# Patient Record
Sex: FEMALE | Race: WHITE | NOT HISPANIC OR LATINO | Employment: UNEMPLOYED | URBAN - NONMETROPOLITAN AREA
[De-identification: names, ages, dates, MRNs, and addresses within clinical notes are randomized per-mention and may not be internally consistent; named-entity substitution may affect disease eponyms.]

---

## 2018-07-11 ENCOUNTER — HOSPITAL ENCOUNTER (EMERGENCY)
Facility: HOSPITAL | Age: 23
Discharge: STILL ADMITTED OR ADMINISTRATIVE | End: 2018-07-11
Attending: EMERGENCY MEDICINE

## 2018-07-11 ENCOUNTER — HOSPITAL ENCOUNTER (INPATIENT)
Facility: HOSPITAL | Age: 23
LOS: 4 days | Discharge: HOME | End: 2018-07-15
Attending: PSYCHIATRY & NEUROLOGY | Admitting: PSYCHIATRY & NEUROLOGY

## 2018-07-11 VITALS
OXYGEN SATURATION: 97 % | TEMPERATURE: 97.9 F | SYSTOLIC BLOOD PRESSURE: 122 MMHG | DIASTOLIC BLOOD PRESSURE: 52 MMHG | WEIGHT: 180 LBS | HEIGHT: 65 IN | HEART RATE: 67 BPM | BODY MASS INDEX: 29.99 KG/M2 | RESPIRATION RATE: 20 BRPM

## 2018-07-11 DIAGNOSIS — F13.20 BENZODIAZEPINE DEPENDENCE (HCC): Primary | ICD-10-CM

## 2018-07-11 DIAGNOSIS — Z34.92 SECOND TRIMESTER PREGNANCY: ICD-10-CM

## 2018-07-11 PROBLEM — F19.20 CHEMICAL DEPENDENCY (HCC): Status: ACTIVE | Noted: 2018-07-11

## 2018-07-11 LAB
6-ACETYL MORPHINE: NEGATIVE
ALBUMIN SERPL-MCNC: 3.8 G/DL (ref 3.5–5)
ALBUMIN/GLOB SERPL: 1.1 G/DL (ref 1.5–2.5)
ALP SERPL-CCNC: 91 U/L (ref 35–104)
ALT SERPL W P-5'-P-CCNC: 7 U/L (ref 10–36)
AMPHET+METHAMPHET UR QL: NEGATIVE
ANION GAP SERPL CALCULATED.3IONS-SCNC: 3.7 MMOL/L (ref 3.6–11.2)
AST SERPL-CCNC: 15 U/L (ref 10–30)
B-HCG UR QL: POSITIVE
BARBITURATES UR QL SCN: NEGATIVE
BASOPHILS # BLD AUTO: 0.03 10*3/MM3 (ref 0–0.3)
BASOPHILS NFR BLD AUTO: 0.3 % (ref 0–2)
BENZODIAZ UR QL SCN: NEGATIVE
BILIRUB SERPL-MCNC: 0.3 MG/DL (ref 0.2–1.8)
BILIRUB UR QL STRIP: NEGATIVE
BUN BLD-MCNC: 5 MG/DL (ref 7–21)
BUN/CREAT SERPL: 8.2 (ref 7–25)
BUPRENORPHINE SERPL-MCNC: POSITIVE NG/ML
CALCIUM SPEC-SCNC: 9.1 MG/DL (ref 7.7–10)
CANNABINOIDS SERPL QL: POSITIVE
CHLORIDE SERPL-SCNC: 110 MMOL/L (ref 99–112)
CLARITY UR: CLEAR
CO2 SERPL-SCNC: 20.3 MMOL/L (ref 24.3–31.9)
COCAINE UR QL: NEGATIVE
COLOR UR: YELLOW
CREAT BLD-MCNC: 0.61 MG/DL (ref 0.43–1.29)
DEPRECATED RDW RBC AUTO: 41.3 FL (ref 37–54)
EOSINOPHIL # BLD AUTO: 0.1 10*3/MM3 (ref 0–0.7)
EOSINOPHIL NFR BLD AUTO: 0.9 % (ref 0–5)
ERYTHROCYTE [DISTWIDTH] IN BLOOD BY AUTOMATED COUNT: 13 % (ref 11.5–14.5)
ETHANOL BLD-MCNC: <10 MG/DL
ETHANOL UR QL: <0.01 %
GFR SERPL CREATININE-BSD FRML MDRD: 121 ML/MIN/1.73
GLOBULIN UR ELPH-MCNC: 3.5 GM/DL
GLUCOSE BLD-MCNC: 78 MG/DL (ref 70–110)
GLUCOSE UR STRIP-MCNC: NEGATIVE MG/DL
HCT VFR BLD AUTO: 37 % (ref 37–47)
HGB BLD-MCNC: 12.5 G/DL (ref 12–16)
HGB UR QL STRIP.AUTO: NEGATIVE
IMM GRANULOCYTES # BLD: 0.13 10*3/MM3 (ref 0–0.03)
IMM GRANULOCYTES NFR BLD: 1.2 % (ref 0–0.5)
KETONES UR QL STRIP: NEGATIVE
LEUKOCYTE ESTERASE UR QL STRIP.AUTO: NEGATIVE
LYMPHOCYTES # BLD AUTO: 3.31 10*3/MM3 (ref 1–3)
LYMPHOCYTES NFR BLD AUTO: 29.8 % (ref 21–51)
MAGNESIUM SERPL-MCNC: 2.2 MG/DL (ref 1.7–2.6)
MCH RBC QN AUTO: 30.3 PG (ref 27–33)
MCHC RBC AUTO-ENTMCNC: 33.8 G/DL (ref 33–37)
MCV RBC AUTO: 89.6 FL (ref 80–94)
METHADONE UR QL SCN: NEGATIVE
MONOCYTES # BLD AUTO: 0.75 10*3/MM3 (ref 0.1–0.9)
MONOCYTES NFR BLD AUTO: 6.7 % (ref 0–10)
NEUTROPHILS # BLD AUTO: 6.8 10*3/MM3 (ref 1.4–6.5)
NEUTROPHILS NFR BLD AUTO: 61.1 % (ref 30–70)
NITRITE UR QL STRIP: NEGATIVE
OPIATES UR QL: NEGATIVE
OSMOLALITY SERPL CALC.SUM OF ELEC: 264.4 MOSM/KG (ref 273–305)
OXYCODONE UR QL SCN: NEGATIVE
PCP UR QL SCN: NEGATIVE
PH UR STRIP.AUTO: 6.5 [PH] (ref 5–8)
PLATELET # BLD AUTO: 153 10*3/MM3 (ref 130–400)
PMV BLD AUTO: 10.1 FL (ref 6–10)
POTASSIUM BLD-SCNC: 4.1 MMOL/L (ref 3.5–5.3)
PROT SERPL-MCNC: 7.3 G/DL (ref 6–8)
PROT UR QL STRIP: NEGATIVE
RBC # BLD AUTO: 4.13 10*6/MM3 (ref 4.2–5.4)
SODIUM BLD-SCNC: 134 MMOL/L (ref 135–153)
SP GR UR STRIP: 1.02 (ref 1–1.03)
UROBILINOGEN UR QL STRIP: NORMAL
WBC NRBC COR # BLD: 11.12 10*3/MM3 (ref 4.5–12.5)

## 2018-07-11 PROCEDURE — 81003 URINALYSIS AUTO W/O SCOPE: CPT | Performed by: PHYSICIAN ASSISTANT

## 2018-07-11 PROCEDURE — 80307 DRUG TEST PRSMV CHEM ANLYZR: CPT | Performed by: PHYSICIAN ASSISTANT

## 2018-07-11 PROCEDURE — 83735 ASSAY OF MAGNESIUM: CPT | Performed by: PHYSICIAN ASSISTANT

## 2018-07-11 PROCEDURE — 93010 ELECTROCARDIOGRAM REPORT: CPT | Performed by: INTERNAL MEDICINE

## 2018-07-11 PROCEDURE — 80053 COMPREHEN METABOLIC PANEL: CPT | Performed by: PHYSICIAN ASSISTANT

## 2018-07-11 PROCEDURE — 85025 COMPLETE CBC W/AUTO DIFF WBC: CPT | Performed by: PHYSICIAN ASSISTANT

## 2018-07-11 PROCEDURE — 93005 ELECTROCARDIOGRAM TRACING: CPT | Performed by: PSYCHIATRY & NEUROLOGY

## 2018-07-11 PROCEDURE — 81025 URINE PREGNANCY TEST: CPT | Performed by: PHYSICIAN ASSISTANT

## 2018-07-11 RX ORDER — PRENATAL VIT/IRON FUM/FOLIC AC 27MG-0.8MG
1 TABLET ORAL DAILY
Status: DISCONTINUED | OUTPATIENT
Start: 2018-07-11 | End: 2018-07-15 | Stop reason: HOSPADM

## 2018-07-11 RX ORDER — CLONAZEPAM 0.5 MG/1
1 TABLET ORAL 2 TIMES DAILY PRN
Status: CANCELLED | OUTPATIENT
Start: 2018-07-11

## 2018-07-11 RX ORDER — MULTIVITAMIN
1 TABLET ORAL DAILY
Status: DISCONTINUED | OUTPATIENT
Start: 2018-07-11 | End: 2018-07-11

## 2018-07-11 RX ORDER — BUPRENORPHINE HYDROCHLORIDE 8 MG/1
24 TABLET SUBLINGUAL DAILY
Status: CANCELLED | OUTPATIENT
Start: 2018-07-11

## 2018-07-11 RX ORDER — BUPRENORPHINE HYDROCHLORIDE 8 MG/1
24 TABLET SUBLINGUAL DAILY
COMMUNITY
End: 2018-07-15 | Stop reason: HOSPADM

## 2018-07-11 RX ORDER — BUPRENORPHINE 2 MG/1
8 TABLET SUBLINGUAL 2 TIMES DAILY
Status: DISCONTINUED | OUTPATIENT
Start: 2018-07-11 | End: 2018-07-15 | Stop reason: HOSPADM

## 2018-07-11 RX ORDER — CLONAZEPAM 1 MG/1
1 TABLET ORAL 2 TIMES DAILY PRN
COMMUNITY
End: 2018-07-15 | Stop reason: HOSPADM

## 2018-07-11 RX ADMIN — PRENATAL VIT W/ FE FUMARATE-FA TAB 27-0.8 MG 1 TABLET: 27-0.8 TAB at 17:40

## 2018-07-11 RX ADMIN — BUPRENORPHINE HCL 8 MG: 2 TABLET SUBLINGUAL at 17:40

## 2018-07-11 NOTE — NURSING NOTE
Patient pockets emptied. Thorough search complete. Undergarments searched by intake nurse and was witnessed () Per ED policy 100. The patient was placed in hospital attire. Belongings were logged on personal belongings sheet. Room was swept for any potential safety hazards room cleared and patient placed in treatment room

## 2018-07-11 NOTE — ED PROVIDER NOTES
Subjective   23-year-old female who presents to the ED today for a detox assessment.  She states she is currently at the [facility] and was brought here today for detox from Klonopin.  She states she uses 4-5 mg of Klonopin daily.  She last used this morning at 10:30 AM.  She states she is 24 weeks gestation.  She is a  Ab0.  She denies any current withdrawal symptoms.  She denies any suicidal ideations.  She states she uses 3 Subutex pills a day and she takes them as prescribed.  She's not had any today.  She denies any alcohol use.        History provided by:  Patient  Drug / Alcohol Assessment   Primary symptoms comment: requesting detox. This is a new problem. The current episode started 3 to 5 hours ago. The problem has not changed since onset.Suspected agents include prescription drugs. Pertinent negatives include no fever, no injury, no nausea, no vomiting, no bladder incontinence and no bowel incontinence. Associated medical issues include addiction treatment.       Review of Systems   Constitutional: Negative for fever.   HENT: Negative.    Eyes: Negative.    Respiratory: Negative.    Cardiovascular: Negative.    Gastrointestinal: Negative for bowel incontinence, nausea and vomiting.   Genitourinary: Negative.  Negative for bladder incontinence.   Musculoskeletal: Negative.    Skin: Negative.    Neurological: Negative.    Psychiatric/Behavioral: Negative.    All other systems reviewed and are negative.      History reviewed. No pertinent past medical history.    No Known Allergies    Past Surgical History:   Procedure Laterality Date   • EAR TUBES Bilateral 83       Family History   Problem Relation Age of Onset   • Bipolar disorder Mother    • ADD / ADHD Neg Hx    • Suicide Attempts Neg Hx    • Self-Injurious Behavior  Neg Hx    • Alcohol abuse Neg Hx        Social History     Social History   • Marital status: Single     Social History Main Topics   • Smoking status: Current Every Day  Smoker     Packs/day: 1.00     Years: 10.00     Start date: 4/2/2008   • Smokeless tobacco: Current User      Comment: pt is pregnant   • Alcohol use No   • Drug use: Yes     Types: Benzodiazepines, Other      Comment: suboxone   • Sexual activity: Yes     Other Topics Concern   • Not on file           Objective   Physical Exam   Constitutional: She is oriented to person, place, and time. She appears well-developed and well-nourished. No distress.   HENT:   Head: Normocephalic and atraumatic.   Right Ear: External ear normal.   Left Ear: External ear normal.   Nose: Nose normal.   Mouth/Throat: Oropharynx is clear and moist.   Eyes: Conjunctivae and EOM are normal. Pupils are equal, round, and reactive to light.   Neck: Normal range of motion. Neck supple.   Cardiovascular: Normal rate, regular rhythm, normal heart sounds and intact distal pulses.    Pulmonary/Chest: Effort normal and breath sounds normal.   Abdominal: Soft. Bowel sounds are normal. There is no tenderness.   Fundus palpable just above the umbilicus.  FHT's normal   Musculoskeletal: Normal range of motion.   Neurological: She is alert and oriented to person, place, and time.   Skin: Skin is warm and dry. Capillary refill takes less than 2 seconds.   Psychiatric: She has a normal mood and affect. Her behavior is normal. Judgment and thought content normal.   Nursing note and vitals reviewed.      Procedures           ED Course  ED Course as of Jul 11 1909   Wed Jul 11, 2018   1602 Medically clear for detox  [AH]      ED Course User Index  [AH] CANDE Clal                  MDM  Number of Diagnoses or Management Options  Benzodiazepine dependence (CMS/HCC):   Second trimester pregnancy:      Amount and/or Complexity of Data Reviewed  Clinical lab tests: reviewed    Patient Progress  Patient progress: stable        Final diagnoses:   Benzodiazepine dependence (CMS/HCC)   Second trimester pregnancy            CANDE Call  07/11/18 3469

## 2018-07-12 PROBLEM — F13.20 BENZODIAZEPINE DEPENDENCE (HCC): Status: ACTIVE | Noted: 2018-07-12

## 2018-07-12 PROBLEM — Z3A.24 24 WEEKS GESTATION OF PREGNANCY: Status: ACTIVE | Noted: 2018-07-12

## 2018-07-12 PROBLEM — F11.20 UNCOMPLICATED OPIOID DEPENDENCE (HCC): Status: ACTIVE | Noted: 2018-07-11

## 2018-07-12 LAB
ABO GROUP BLD: NORMAL
ABO GROUP BLD: NORMAL
ALBUMIN SERPL-MCNC: 3.7 G/DL (ref 3.5–5)
ALBUMIN/GLOB SERPL: 1.2 G/DL (ref 1.5–2.5)
ALP SERPL-CCNC: 84 U/L (ref 35–104)
ALT SERPL W P-5'-P-CCNC: 6 U/L (ref 10–36)
ANION GAP SERPL CALCULATED.3IONS-SCNC: 3.3 MMOL/L (ref 3.6–11.2)
AST SERPL-CCNC: 9 U/L (ref 10–30)
BASOPHILS # BLD AUTO: 0.02 10*3/MM3 (ref 0–0.3)
BASOPHILS NFR BLD AUTO: 0.2 % (ref 0–2)
BILIRUB SERPL-MCNC: 0.3 MG/DL (ref 0.2–1.8)
BLD GP AB SCN SERPL QL: NEGATIVE
BUN BLD-MCNC: 10 MG/DL (ref 7–21)
BUN/CREAT SERPL: 16.9 (ref 7–25)
CALCIUM SPEC-SCNC: 8.9 MG/DL (ref 7.7–10)
CHLORIDE SERPL-SCNC: 106 MMOL/L (ref 99–112)
CO2 SERPL-SCNC: 26.7 MMOL/L (ref 24.3–31.9)
CREAT BLD-MCNC: 0.59 MG/DL (ref 0.43–1.29)
DEPRECATED RDW RBC AUTO: 40.2 FL (ref 37–54)
EOSINOPHIL # BLD AUTO: 0.12 10*3/MM3 (ref 0–0.7)
EOSINOPHIL NFR BLD AUTO: 1.4 % (ref 0–5)
ERYTHROCYTE [DISTWIDTH] IN BLOOD BY AUTOMATED COUNT: 13 % (ref 11.5–14.5)
GFR SERPL CREATININE-BSD FRML MDRD: 125 ML/MIN/1.73
GLOBULIN UR ELPH-MCNC: 3.1 GM/DL
GLUCOSE BLD-MCNC: 76 MG/DL (ref 70–110)
HBV SURFACE AG SERPL QL IA: NORMAL
HCT VFR BLD AUTO: 33.9 % (ref 37–47)
HCV AB SER DONR QL: REACTIVE
HGB BLD-MCNC: 11.6 G/DL (ref 12–16)
IMM GRANULOCYTES # BLD: 0.1 10*3/MM3 (ref 0–0.03)
IMM GRANULOCYTES NFR BLD: 1.1 % (ref 0–0.5)
LYMPHOCYTES # BLD AUTO: 3.18 10*3/MM3 (ref 1–3)
LYMPHOCYTES NFR BLD AUTO: 36.5 % (ref 21–51)
MCH RBC QN AUTO: 30.4 PG (ref 27–33)
MCHC RBC AUTO-ENTMCNC: 34.2 G/DL (ref 33–37)
MCV RBC AUTO: 88.7 FL (ref 80–94)
MONOCYTES # BLD AUTO: 0.54 10*3/MM3 (ref 0.1–0.9)
MONOCYTES NFR BLD AUTO: 6.2 % (ref 0–10)
NEUTROPHILS # BLD AUTO: 4.76 10*3/MM3 (ref 1.4–6.5)
NEUTROPHILS NFR BLD AUTO: 54.6 % (ref 30–70)
NUMBER OF DOSES: NORMAL
OSMOLALITY SERPL CALC.SUM OF ELEC: 269.8 MOSM/KG (ref 273–305)
PLATELET # BLD AUTO: 333 10*3/MM3 (ref 130–400)
PMV BLD AUTO: 9.7 FL (ref 6–10)
POTASSIUM BLD-SCNC: 3.9 MMOL/L (ref 3.5–5.3)
PROT SERPL-MCNC: 6.8 G/DL (ref 6–8)
RBC # BLD AUTO: 3.82 10*6/MM3 (ref 4.2–5.4)
RH BLD: POSITIVE
RH BLD: POSITIVE
SODIUM BLD-SCNC: 136 MMOL/L (ref 135–153)
WBC NRBC COR # BLD: 8.72 10*3/MM3 (ref 4.5–12.5)

## 2018-07-12 PROCEDURE — 80053 COMPREHEN METABOLIC PANEL: CPT | Performed by: OBSTETRICS & GYNECOLOGY

## 2018-07-12 PROCEDURE — 87340 HEPATITIS B SURFACE AG IA: CPT | Performed by: OBSTETRICS & GYNECOLOGY

## 2018-07-12 PROCEDURE — 86850 RBC ANTIBODY SCREEN: CPT | Performed by: PSYCHIATRY & NEUROLOGY

## 2018-07-12 PROCEDURE — 85025 COMPLETE CBC W/AUTO DIFF WBC: CPT | Performed by: OBSTETRICS & GYNECOLOGY

## 2018-07-12 PROCEDURE — 86900 BLOOD TYPING SEROLOGIC ABO: CPT | Performed by: PSYCHIATRY & NEUROLOGY

## 2018-07-12 PROCEDURE — 99222 1ST HOSP IP/OBS MODERATE 55: CPT | Performed by: PSYCHIATRY & NEUROLOGY

## 2018-07-12 PROCEDURE — 86900 BLOOD TYPING SEROLOGIC ABO: CPT

## 2018-07-12 PROCEDURE — 87536 HIV-1 QUANT&REVRSE TRNSCRPJ: CPT | Performed by: OBSTETRICS & GYNECOLOGY

## 2018-07-12 PROCEDURE — 86592 SYPHILIS TEST NON-TREP QUAL: CPT | Performed by: OBSTETRICS & GYNECOLOGY

## 2018-07-12 PROCEDURE — 86901 BLOOD TYPING SEROLOGIC RH(D): CPT

## 2018-07-12 PROCEDURE — 86901 BLOOD TYPING SEROLOGIC RH(D): CPT | Performed by: PSYCHIATRY & NEUROLOGY

## 2018-07-12 PROCEDURE — 86803 HEPATITIS C AB TEST: CPT | Performed by: OBSTETRICS & GYNECOLOGY

## 2018-07-12 RX ADMIN — BUPRENORPHINE HCL 8 MG: 2 TABLET SUBLINGUAL at 08:44

## 2018-07-12 RX ADMIN — PRENATAL VIT W/ FE FUMARATE-FA TAB 27-0.8 MG 1 TABLET: 27-0.8 TAB at 08:43

## 2018-07-12 RX ADMIN — BUPRENORPHINE HCL 8 MG: 2 TABLET SUBLINGUAL at 21:02

## 2018-07-12 NOTE — PLAN OF CARE
Problem: Patient Care Overview  Goal: Plan of Care Review  Outcome: Ongoing (interventions implemented as appropriate)   07/12/18 0033   Coping/Psychosocial   Plan of Care Reviewed With patient   Coping/Psychosocial   Patient Agreement with Plan of Care agrees   Plan of Care Review   Progress no change   OTHER   Outcome Summary Patient calm and cooperative. Rates anxiety 5. Denies depression, blanco., cravings. C/O leg cramps. No problems noted. Will continue to monitor.        Problem: Overarching Goals (Adult)  Goal: Adheres to Safety Considerations for Self and Others  Outcome: Ongoing (interventions implemented as appropriate)    Goal: Optimized Coping Skills in Response to Life Stressors  Outcome: Ongoing (interventions implemented as appropriate)    Goal: Develops/Participates in Therapeutic [place] to Support Successful Transition  Outcome: Ongoing (interventions implemented as appropriate)

## 2018-07-12 NOTE — PLAN OF CARE
Problem: Patient Care Overview  Goal: Plan of Care Review  Outcome: Ongoing (interventions implemented as appropriate)   07/12/18 1136   Coping/Psychosocial   Plan of Care Reviewed With patient   Coping/Psychosocial   Patient Agreement with Plan of Care agrees   Plan of Care Review   Progress no change   OTHER   Outcome Summary Therapist met with Patient to complete initial assessment and aftercare planning; Patient agreeable.    Coping/Psychosocial   Consent Given to Review Plan with Jonathan Kc (boyfriend) at [phone], Hina Miles (aunt) [phone]     Goal: Individualization and Mutuality  Outcome: Ongoing (interventions implemented as appropriate)   07/12/18 1131 07/12/18 1136   Individualization   Patient Specific Goals (Include Timeframe) --  Identify healthy coping skills and address relapse prevention planning during treatment.    Patient Specific Interventions --  Therapist will offer 1-4 therapy sessions, daily group, family education, and aftercare services.    Mutuality/Individual Preferences   What Anxieties, Fears, Concerns, or Questions Do You Have About Your Care? --  none   What Information Would Help Us Give You More Personalized Care? --  none   How Would You and/or Your Support Person Like to Participate in Your Care? --  communciation    Personal Strengths/Vulnerabilities   Patient Personal Strengths self-reliant;self-awareness;resourceful;stable living environment;tolerant;resilient;realistic evaluation of current/future capabilities;relationship stability;motivated for treatment;motivated for recovery;insight into illness/situation;expressive of needs;expressive of emotions --    Patient Vulnerabilities ineffective coping, limited support, substance abuse, current boyfriend active user  --      Goal: Discharge Needs Assessment  Outcome: Outcome(s) achieved Date Met: 07/12/18 07/12/18 1136   Discharge Needs Assessment   Readmission Within the Last 30 Days no  previous admission in last 30 days   Concerns to be Addressed substance/tobacco abuse/use;mental health;medication;coping/stress;decision making;cognitive/perceptual;compliance issue   Patient/Family Anticipates Transition to inpatient rehabilitation facility   Patient/Family Anticipated Services at Transition mental health services;rehabilitation services   Transportation Concerns car, none   Transportation Anticipated agency   Offered/Gave Vendor List no   Patient's Choice of Community Agency(s) [facility]    Current Discharge Risk substance use/abuse;lack of support system/caregiver   Discharge Coordination/Progress Therapist met with Mary Kaytent to complete discharge needs assessment and aftercare planning. Patient agreeable and reports to attend [facility] at discharge.    Discharge Needs Assessment,    Outpatient/Agency/Support Group Needs residential services   Anticipated Discharge Disposition inpatient rehab facility     Goal: Interprofessional Rounds/Family Conf  Outcome: Ongoing (interventions implemented as appropriate)   07/12/18 1136   Interdisciplinary Rounds/Family Conf   Summary Treatment Team Evalautions and Staffing    Interdisciplinary Rounds/Family Conf   Participants social work;patient;nursing;psychiatrist;other (see comments)  ( Navigators, UR)     1120  DATA:    Therapist met individually with Patient this date for initial evaluation.  Introduced self as Therapist and the role of a positive therapeutic relationship; Patient agreeable.    Therapist encouraged Patient to speak openly and honestly about any issues or stressors during treatment stay. Therapist explained how open communication is significant to providing most effective care. Therapist completed psychosocial assessment, integrated summary, reviewed care plans, disposition planning and discussed hospitalization expectations and treatment goals this date. Patient reports future plans to follow-up with residential  "services at the [facility] and signed consent. Patient also agreeable for family involvement in treatment and signed consent for her aunt Hina and boyfriend Jonathan.      ASSESSMENT:   Ms. Chinmay Thomas is a 23 year old, , single, mother of three and expecting, high school educated, female from [place], [place]. Patient presents herself to detoxification in efforts to gain sobriety with hopes to regain custody of her children. Patient reports being referred by the [facility] and plans to return there at discharge. Patient reports a history of substance beginning at age 14 with her children's father. Patient reports using Subutex and Klonopin \"more than prescribed\". Patient reports compliance with a suboxone clinic in [place] called \"[facility]\" for the past 2.5 years. Patient reports paying $575 per office visit. Patient reports recent IV use for the past year. Patient states being in a current relationship with an active user at this time. Patient reports lack of support, unemployment, and previous legal charges due to substance abuse.     Patient observed to be alert, calm, and cooperative today. Patient had difficulty providing substance use history and seemed to be minimal about her current use offering justification and explaining that she complies with a clinic. Patient did not appear to be motivated regarding sobriety today due to her somewhat vague responses.      PLAN:   Patient will receive 24/7 nursing monitoring and daily psychiatrist evaluation by a multidisciplinary team.    Patient will continue stabilization at this time.     Patient is agreeable for residential services with the [facility] at discharge.     Public assistance with transportation will not be needed. [facility] will provide.     Therapist will contact Patient's family members at a later time to involve in treatment.       "

## 2018-07-12 NOTE — CONSULTS
Chief complaint I was consulted regarding Mrs. Thomas.     History of Present Illness: Patient is a 23 y.o. female who presents with IUP at an estimated 24 weeks gestation. Patient is currently in Detox Day 2. Patient is taking Subutex. Prenatal care in [place].        Past Medical History:   Diagnosis Date   • Substance abuse         Past Surgical History:   Procedure Laterality Date   • EAR TUBES Bilateral 01/01/84     Family History   Problem Relation Age of Onset   • Bipolar disorder Mother    • ADD / ADHD Neg Hx    • Suicide Attempts Neg Hx    • Self-Injurious Behavior  Neg Hx    • Alcohol abuse Neg Hx      Social History   Substance Use Topics   • Smoking status: Current Every Day Smoker     Packs/day: 1.00     Years: 10.00     Start date: 4/2/2008   • Smokeless tobacco: Current User      Comment: pt is pregnant   • Alcohol use No     Prescriptions Prior to Admission   Medication Sig Dispense Refill Last Dose   • buprenorphine (SUBUTEX) 8 MG sublingual tablet SL tablet Place 24 mg under the tongue Daily.   7/10/2018 at Unknown time   • clonazePAM (KlonoPIN) 1 MG tablet Take 1 mg by mouth 2 (Two) Times a Day As Needed for Anxiety.   Unknown at Unknown time     Allergies:  Patient has no known allergies.      Vital Signs  Temp:  [96.8 °F (36 °C)-97.9 °F (36.6 °C)] 97 °F (36.1 °C)  Heart Rate:  [59-78] 59  Resp:  [18-20] 18  BP: ()/(47-71) 103/63    Radiology  Imaging Results (last 24 hours)     ** No results found for the last 24 hours. **          Labs  Lab Results (last 24 hours)     ** No results found for the last 24 hours. **            Review of Systems    The following systems were reviewed and negative;  ENT, respiratory, cardiovascular, gastrointestinal, genitourinary, breast, endocrine and allergies / immunologic.      Physical Exam:      General Appearance:    Alert, cooperative, in no acute distress   Head:    Normocephalic, without obvious abnormality, atraumatic   Eyes:             Lids and lashes normal, conjunctivae and sclerae normal, no   icterus, no pallor, corneas clear, PERRLA   Ears:    Ears appear intact with no abnormalities noted   Throat:   No oral lesions, no thrush, oral mucosa moist   Neck:   No adenopathy, supple, trachea midline, no thyromegaly, no     carotid bruit, no JVD   Back:     No kyphosis present, no scoliosis present, no skin lesions,       erythema or scars, no tenderness to percussion or                   palpation,   range of motion normal   Lungs:     Clear to auscultation,respirations regular, even and                   unlabored    Heart:    Regular rhythm and normal rate, normal S1 and S2, no            murmur, no gallop, no rub, no click   Breast Exam:    Deferred   Abdomen:     Normal bowel sounds, no masses, no organomegaly, soft        non-tender, non-distended, no guarding, no rebound                 tenderness   Genitalia:    Deferred   Extremities:   Moves all extremities well, no edema, no cyanosis, no              redness   Pulses:   Pulses palpable and equal bilaterally   Skin:   No bleeding, bruising or rash   Lymph nodes:   No palpable adenopathy   Neurologic:   Cranial nerves 2 - 12 grossly intact, sensation intact, DTR        present and equal bilaterally         Assessment: Patient is a 23 y.o. female who presents with IUP at 24 weeks gestation. Drug Abuse. Currently in Detox. Subutex.     Plan of Care: Prenatal Labs today. 1 hr GTT & Ultrasound next visit.      Jere Parry III, MD  07/12/18  2:21 PM

## 2018-07-12 NOTE — PLAN OF CARE
Problem: Overarching Goals (Adult)  Goal: Optimized Coping Skills in Response to Life Stressors  Outcome: Ongoing (interventions implemented as appropriate)    Goal: Develops/Participates in Therapeutic [place] to Support Successful Transition  Outcome: Ongoing (interventions implemented as appropriate)

## 2018-07-12 NOTE — DISCHARGE INSTR - APPOINTMENTS
[facility]  [address] [place], [place] [address]adrianna [place] [id number] [phone]    Can return to [facility] upon discharge from our facility    F/U  Dr. Jere Parry MD  8/9/2018  1:00 PM  [facility]  [phone]  [email or web address]

## 2018-07-12 NOTE — PROGRESS NOTES
[id number]  Therapist attempted to contact Patient's boyfriend Jonathan Tavares at [phone] however was unsuccessful. Therapist also attempted to contact Patient's aunt Hina Miles at [phone] but was unreachable; efforts will continue.

## 2018-07-12 NOTE — H&P
INITIAL PSYCHIATRIC HISTORY & PHYSICAL    Patient Identification:  Name:   Chinmay Thomas  Age:   23 y.o.  Sex:   female  :   1995  MRN:   [id number]  Visit Number:   [id number]  Primary Care Physician:   No Known Provider    SUBJECTIVE    CC: substance abuse     HPI: Chinmay Thomas is a 23 y.o. female who was admitted on 2018 with complaints of substance abuse. Patient presented to [facility] from [facility] requesting detoxification. Patient has noted positive HCG urine QL. She reports she is 24 week pregnant.  Patient reports being prescribed suboxone for past 3 yeas 3-8 mg strip daily. Reports she's been using up to 4-5 mg daily during the past including via  IV route.  . She reports upon finding out she was pregnant 2 months ago she began using Subutex po only 3-8 mg pills daily and up to 4-5 pills daily, last use , 2 days ago.    She also reports being prescribed Klonopin 2 mg daily for past 4-5 years. Reports at times she will use up to 4-5 mg daily , running out of medication and going a few days without , last use 2018. Reports smoking marijuana occasionally.   UDS is positive for buprenorphine and THC. She denies etoh use or other illicit drug use.  She reports she began using marijuana at 14 and smoking daily at the time. Reports at 15 she began using Meth with her Mother, didn't like but began using pain pills and use quickly became problematic. Reports at that time she experimented with Suboxone and began [facility].   She reports  chonic back pain following epidural childbirth.  She reports previous attempts have been made unsuccessfully r/t withdrawal symptoms. . She identifies a couple of days as  longest period of sobriety. She identifies several negative consequences of use including  Financial, relationship (she has lost custody of 3 children ) Reports paternal Grandmother temporary custody. She reports desire to obtain  sobriety and regain custody of children.  She reports ongoing  charges for Trespassing in [place] from 2016.  Patient  Reports failed IV attempt (says she missed vein)  on right hand . She plans to return to [facility]. Reports she's seen Jared Horowitz in [place], last appointment a month ago and says she was scheduled to see him today.  RN reports patient has scheduled obgyn consult today with [facility] at this facility. She denies sucidal ideation. She denies hallucination. She reports poor sleep and fair appetite.  She reports current generalized body aches , leg cramps, headache. She was admitted to the [facility] for safety and further stabilization.     Noted labs reveal  HCG QL positive       PAST PSYCHIATRIC HX:  Denies previous psychiatric or detoxification treatment.  Reports struggling with anxiety for years with medication, Klonopin for past 4-5 years.  She denies history of sexual, physical or emotional abuse. Previous self mutilation  behavior or suicidal attempts.     SUBSTANCE USE HX:  UDS is positive for buprenorphine and THC.  See hpi for current use. Denies previous etoh use. Reports [facility] in [place]     SOCIAL HX:  Patient is Single, has children who are 7,2 and 1 years old, reports children are in temporary custody of paternal Grandmother. He was born in [place], raised in [place], parents are  and live in [place]. Patient has 1 full sister and one half sister, two step sisters. Reports supportive family .  She lives in house with significant other and children . 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  Denies  experience . She has current charges for trespassing in [place] from 2016. Reports God is her Higher Power.     Denies previous history of seizure or concussion.   Past Medical History:   Diagnosis Date   • Substance abuse        Past Surgical History:   Procedure  Laterality Date   • EAR TUBES Bilateral 01/01/84       Family History   Problem Relation Age of Onset   • Bipolar disorder Mother    • ADD / ADHD Neg Hx    • Suicide Attempts Neg Hx    • Self-Injurious Behavior  Neg Hx    • Alcohol abuse Neg Hx          Prescriptions Prior to Admission   Medication Sig Dispense Refill Last Dose   • buprenorphine (SUBUTEX) 8 MG sublingual tablet SL tablet Place 24 mg under the tongue Daily.   7/10/2018 at Unknown time   • clonazePAM (KlonoPIN) 1 MG tablet Take 1 mg by mouth 2 (Two) Times a Day As Needed for Anxiety.   Unknown at Unknown time       Reviewed available past medical and psychiatric records.    ALLERGIES:  Patient has no known allergies.    Temp:  [96.8 °F (36 °C)-97.9 °F (36.6 °C)] 97 °F (36.1 °C)  Heart Rate:  [59-78] 59  Resp:  [18-20] 18  BP: ()/(47-71) 103/63    REVIEW OF SYSTEMS:  Review of Systems   Constitutional: Negative.    HENT: Negative.    Eyes: Negative.    Respiratory: Negative.    Cardiovascular: Negative.    Gastrointestinal: Negative.    Endocrine: Negative.    Genitourinary: Negative.    Musculoskeletal: Negative.    Skin: Negative.    Allergic/Immunologic: Negative.    Neurological: Negative.    Hematological: Negative.    Psychiatric/Behavioral: Negative.       See HPI for psychiatric ROS  OBJECTIVE    PHYSICAL EXAM:  Physical Exam    MENTAL STATUS EXAM:   Hygiene: good    Psychomotor Behavior:  Appropriate  Affect:  Appropriate  Hopelessness: Denies  Speech:  Normal  Thought Progress:  Linear  Thought Content:  Mood congurent  Suicidal:  None  Homicidal:  None  Hallucinations:  None  Delusion:  None  Memory:  Intact  Orientation:  Person, Place, Time and Situation  Reliability:  fair  Insight:  Fair  Judgement:  Poor  Impulse Control:  Poor  Physical/Medical Issues see medical list       Imaging Results (last 24 hours)     ** No results found for the last 24 hours. **           ECG/EMG Results (most recent)     Procedure Component Value Units  Date/Time    ECG 12 Lead [[id number]] Collected:  07/11/18 1721     Updated:  07/12/18 0819    Narrative:       Test Reason : Potential adverse reaction to medications.  Blood Pressure : **/** mmHG  Vent. Rate : 083 BPM     Atrial Rate : 083 BPM     P-R Int : 144 ms          QRS Dur : 090 ms      QT Int : 380 ms       P-R-T Axes : 044 055 037 degrees     QTc Int : 446 ms    Normal sinus rhythm  Normal ECG  No previous ECGs available  Confirmed by Laura Balderas ([id number]) on 7/12/2018 8:18:55 AM    Referred By:  MANAV           Confirmed By:Laura Balderas           Lab Results   Component Value Date    GLUCOSE 78 07/11/2018    BUN 5 (L) 07/11/2018    CREATININE 0.61 07/11/2018    EGFRIFNONA 121 07/11/2018    BCR 8.2 07/11/2018    CO2 20.3 (L) 07/11/2018    CALCIUM 9.1 07/11/2018    ALBUMIN 3.80 07/11/2018    LABIL2 1.1 (L) 07/11/2018    AST 15 07/11/2018    ALT 7 (L) 07/11/2018       Lab Results   Component Value Date    WBC 11.12 07/11/2018    HGB 12.5 07/11/2018    HCT 37.0 07/11/2018    MCV 89.6 07/11/2018     07/11/2018       Pain Management Panel     Pain Management Panel Latest Ref Rng & Units 7/11/2018    AMPHETAMINES SCREEN, URINE Negative Negative    BARBITURATES SCREEN Negative Negative    BENZODIAZEPINE SCREEN, URINE Negative Negative    BUPRENORPHINE Negative Positive(A)    COCAINE SCREEN, URINE Negative Negative    METHADONE SCREEN, URINE Negative Negative          Brief Urine Lab Results  (Last result in the past 365 days)      Color   Clarity   Blood   Leuk Est   Nitrite   Protein   CREAT   Urine HCG        07/11/18 1530               Positive(A)     07/11/18 1530 Yellow Clear Negative Negative Negative Negative               Reviewed labs and studies done with this admission.       ASSESSMENT & PLAN:      Patient Active Problem List   Diagnosis Code   • Uncomplicated opioid dependence (CMS/HCC) F11.20   • Benzodiazepine dependence (CMS/HCC) F13.20   • 24 weeks gestation of pregnancy Z3A.24      The patient will be continued on Subutex and dose adjusted to 16 mg daily. Explained to her that the lower dose will help to lower the risk of LISA. She is not reporting any withdrawal symptoms from Klonopin. Will not start her on Ativan detox yet and continue to monitor. The patient is agreeable.    The patient has been admitted for safety and stabilization.  Patient will be monitored for suicidality daily and maintained on 30 minute rounds for safety.  The patient will have individual and group therapy with a master's level therapist. A master treatment plan will be developed and agreed upon by the patient and his/her treatment team.  The patient's estimated length of stay in the hospital is 5-7 days.       This note was generated using a scribe, Sadia Osei RN   The work documented in this note was completed, reviewed, and approved by the attending psychiatrist as designated Dr. MARISSA block.

## 2018-07-12 NOTE — PLAN OF CARE
Problem: Patient Care Overview  Goal: Plan of Care Review  Outcome: Ongoing (interventions implemented as appropriate)   07/12/18 5262   Coping/Psychosocial   Plan of Care Reviewed With patient   Plan of Care Review   Progress improving

## 2018-07-13 PROCEDURE — 99232 SBSQ HOSP IP/OBS MODERATE 35: CPT | Performed by: PSYCHIATRY & NEUROLOGY

## 2018-07-13 RX ADMIN — BUPRENORPHINE HCL 8 MG: 2 TABLET SUBLINGUAL at 08:22

## 2018-07-13 RX ADMIN — BUPRENORPHINE HCL 8 MG: 2 TABLET SUBLINGUAL at 20:59

## 2018-07-13 RX ADMIN — PRENATAL VIT W/ FE FUMARATE-FA TAB 27-0.8 MG 1 TABLET: 27-0.8 TAB at 08:22

## 2018-07-13 NOTE — PLAN OF CARE
Problem: Patient Care Overview  Goal: Plan of Care Review  Outcome: Ongoing (interventions implemented as appropriate)   07/13/18 1621   Coping/Psychosocial   Plan of Care Reviewed With patient   Coping/Psychosocial   Patient Agreement with Plan of Care agrees   Plan of Care Review   Progress improving       Problem: Overarching Goals (Adult)  Goal: Adheres to Safety Considerations for Self and Others  Outcome: Ongoing (interventions implemented as appropriate)   07/13/18 1621   Overarching Goals (Adult)   Adheres to Safety Considerations for Self and Others making progress toward outcome     Goal: Optimized Coping Skills in Response to Life Stressors  Outcome: Ongoing (interventions implemented as appropriate)   07/13/18 1621   Overarching Goals (Adult)   Optimized Coping Skills in Response to Life Stressors making progress toward outcome     Goal: Develops/Participates in Therapeutic [place] to Support Successful Transition  Outcome: Ongoing (interventions implemented as appropriate)   07/13/18 1621   Overarching Goals (Adult)   Develops/Participates in Therapeutic [place] to Support Successful Transition making progress toward outcome

## 2018-07-13 NOTE — PROGRESS NOTES
"INPATIENT PSYCHIATRIC PROGRESS NOTE    Name:  Chinmay Thomas  :  1995  MRN:  [id number]  Visit Number:  [id number]  Length of stay:  2    SUBJECTIVE  CC: opioid withdrawals    INTERVAL HISTORY:  The patient states that she is experiencing withdrawals including leg cramps and diarrhea. Her vital signs are stable and has a good appetite. She was able to sleep about 7 hours last night and also took day time nap. She may be experiencing subjective discomfort as her dose was adjusted to Subutex 16 mg from 24 mg.       Review of Systems   Constitutional: Negative.    HENT: Negative.    Eyes: Negative.    Respiratory: Negative.    Cardiovascular: Negative.    Gastrointestinal: Positive for diarrhea.   Genitourinary: Negative.    Musculoskeletal:        Leg cramps       OBJECTIVE    Temp:  [97 °F (36.1 °C)-97.8 °F (36.6 °C)] 97 °F (36.1 °C)  Heart Rate:  [65-84] 83  Resp:  [16-18] 18  BP: (105-119)/(54-70) 118/70    MENTAL STATUS EXAM:  Appearance:Casually dressed, good hygeine.   Cooperation:Cooperative  Psychomotor: No psychomotor agitation/retardation, No EPS, No motor tics  Speech-normal rate, amount.  Mood \"ok\"   Affect-congruent, appropriate, stable  Thought Content-goal directed, no delusional material present  Thought process-linear, organized.  Suicidality: No SI  Homicidality: No HI  Perception: No AH/VH  Insight-fair   Judgement-fair    Lab Results (last 24 hours)     Procedure Component Value Units Date/Time    Hepatitis C Antibody [[id number]]  (Abnormal) Collected:  18 160    Specimen:  Blood Updated:  18 175     Hepatitis C Ab Reactive (A)    Hepatitis B Surface Antigen [[id number]]  (Normal) Collected:  18 160    Specimen:  Blood Updated:  18 1721     Hepatitis B Surface Ag Non-Reactive    Osmolality, Calculated [[id number]]  (Abnormal) Collected:  18 160    Specimen:  Blood Updated:  18 170     Osmolality Calc 269.8 (L) mOsm/kg     Comprehensive " Metabolic Panel [[id number]]  (Abnormal) Collected:  07/12/18 1602    Specimen:  Blood Updated:  07/12/18 1709     Glucose 76 mg/dL      BUN 10 mg/dL      Creatinine 0.59 mg/dL      Sodium 136 mmol/L      Potassium 3.9 mmol/L      Chloride 106 mmol/L      CO2 26.7 mmol/L      Calcium 8.9 mg/dL      Total Protein 6.8 g/dL      Albumin 3.70 g/dL      ALT (SGPT) 6 (L) U/L      AST (SGOT) 9 (L) U/L      Alkaline Phosphatase 84 U/L      Comment: Note New Reference Ranges        Total Bilirubin 0.3 mg/dL      eGFR Non African Amer 125 mL/min/1.73      Globulin 3.1 gm/dL      A/G Ratio 1.2 (L) g/dL      BUN/Creatinine Ratio 16.9     Anion Gap 3.3 (L) mmol/L     CBC & Differential [[id number]] Collected:  07/12/18 1602    Specimen:  Blood Updated:  07/12/18 1641    Narrative:       The following orders were created for panel order CBC & Differential.  Procedure                               Abnormality         Status                     ---------                               -----------         ------                     CBC Auto Differential[[id number]]        Abnormal            Final result                 Please view results for these tests on the individual orders.    CBC Auto Differential [[id number]]  (Abnormal) Collected:  07/12/18 1602    Specimen:  Blood Updated:  07/12/18 1641     WBC 8.72 10*3/mm3      RBC 3.82 (L) 10*6/mm3      Hemoglobin 11.6 (L) g/dL      Hematocrit 33.9 (L) %      MCV 88.7 fL      MCH 30.4 pg      MCHC 34.2 g/dL      RDW 13.0 %      RDW-SD 40.2 fl      MPV 9.7 fL      Platelets 333 10*3/mm3      Neutrophil % 54.6 %      Lymphocyte % 36.5 %      Monocyte % 6.2 %      Eosinophil % 1.4 %      Basophil % 0.2 %      Immature Grans % 1.1 (H) %      Neutrophils, Absolute 4.76 10*3/mm3      Lymphocytes, Absolute 3.18 (H) 10*3/mm3      Monocytes, Absolute 0.54 10*3/mm3      Eosinophils, Absolute 0.12 10*3/mm3      Basophils, Absolute 0.02 10*3/mm3      Immature Grans, Absolute 0.10 (H) 10*3/mm3      HIV-1 RNA, Quantitative, PCR (graph) [[id number]] Collected:  07/12/18 1602    Specimen:  Blood Updated:  07/12/18 1626    RPR [[id number]] Collected:  07/12/18 1602    Specimen:  Blood Updated:  07/12/18 1626             Imaging Results (last 24 hours)     ** No results found for the last 24 hours. **             ECG/EMG Results (most recent)     Procedure Component Value Units Date/Time    ECG 12 Lead [[id number]] Collected:  07/11/18 1721     Updated:  07/12/18 0819    Narrative:       Test Reason : Potential adverse reaction to medications.  Blood Pressure : **/** mmHG  Vent. Rate : 083 BPM     Atrial Rate : 083 BPM     P-R Int : 144 ms          QRS Dur : 090 ms      QT Int : 380 ms       P-R-T Axes : 044 055 037 degrees     QTc Int : 446 ms    Normal sinus rhythm  Normal ECG  No previous ECGs available  Confirmed by Laura Balderas ([id number]) on 7/12/2018 8:18:55 AM    Referred By:  MANAV           Confirmed By:Laura Balderas           ALLERGIES: Patient has no known allergies.      Current Facility-Administered Medications:   •  buprenorphine (SUBUTEX) SL tablet 8 mg, 8 mg, Sublingual, BID, Sharyn Garcia MD, 8 mg at 07/13/18 0822  •  prenatal vitamin 27-0.8 tablet 1 tablet, 1 tablet, Oral, Daily, Sharyn Garcia MD, 1 tablet at 07/13/18 0822    ASSESSMENT & PLAN:    Active Problems:    Uncomplicated opioid dependence (CMS/HCC)  Assessment: The patient appears stable, though is reporting subjective discomfort  Plan: The patient will be maintained on Subutex 16 mg during her pregnancy and then in about 12 weeks when she delivers the baby, the dose of Subutex will be tapered down.      Benzodiazepine dependence (CMS/HCC)  Assessment: No acute withdrawals noted. Pt reports no hx of withdrawals from benzos.  Plan: Continue to monitor and treat as indicated.      24 weeks gestation of pregnancy  Assessment: Stable  Plan: Prenatal vitamins. Appreciate help by the OB department.    The patient may be discharged to the  [facility] tomorrow if she is stable.      Suicide precautions: None    Behavioral Health Treatment Plan and Problem List: I have reviewed and approved the Behavioral Health Treatment Plan and Problem list.  The patient has had a chance to review and agrees with the treatment plan.     Clinician:  Sharyn Garcia MD  07/13/18  4:13 PM

## 2018-07-13 NOTE — PLAN OF CARE
Problem: Patient Care Overview  Goal: Plan of Care Review  Outcome: Ongoing (interventions implemented as appropriate)   07/13/18 0207   Coping/Psychosocial   Plan of Care Reviewed With patient   Coping/Psychosocial   Patient Agreement with Plan of Care agrees   Plan of Care Review   Progress improving   OTHER   Outcome Summary Pt cooperative but irritable for pm shift on 7/12/18. Reports anxiety 8/10, denies depression and cravings 7/10. Participated in group. 7/13/18 0208       Problem: Overarching Goals (Adult)  Goal: Adheres to Safety Considerations for Self and Others  Outcome: Ongoing (interventions implemented as appropriate)    Goal: Optimized Coping Skills in Response to Life Stressors  Outcome: Ongoing (interventions implemented as appropriate)    Goal: Develops/Participates in Therapeutic [place] to Support Successful Transition  Outcome: Ongoing (interventions implemented as appropriate)

## 2018-07-13 NOTE — PROGRESS NOTES
Navigator is assisting primary therapist with discharge planning. Contacted [facility] who confirms that the patient can return there upon her discharge from this facility. They will also provide transportation for the patient to [facility].

## 2018-07-14 LAB — RPR SER QL: NON REACTIVE

## 2018-07-14 PROCEDURE — 99232 SBSQ HOSP IP/OBS MODERATE 35: CPT | Performed by: PSYCHIATRY & NEUROLOGY

## 2018-07-14 RX ADMIN — BUPRENORPHINE HCL 8 MG: 2 TABLET SUBLINGUAL at 08:16

## 2018-07-14 RX ADMIN — BUPRENORPHINE HCL 8 MG: 2 TABLET SUBLINGUAL at 21:31

## 2018-07-14 RX ADMIN — PRENATAL VIT W/ FE FUMARATE-FA TAB 27-0.8 MG 1 TABLET: 27-0.8 TAB at 08:16

## 2018-07-14 NOTE — PROGRESS NOTES
"INPATIENT PSYCHIATRIC PROGRESS NOTE    Name:  Chinmay Thomas  :  1995  MRN:  [id number]  Visit Number:  [id number]  Length of stay:  3    SUBJECTIVE  CC: opioid withdrawals    INTERVAL HISTORY:  The patient states around this time she normally starts to experience more w/d symptoms. States she is feeling irritable and having back soreness. Requesting to be on 24 mg of Subutex.       Review of Systems   Constitutional: Negative.    HENT: Negative.    Eyes: Negative.    Respiratory: Negative.    Cardiovascular: Negative.    Gastrointestinal: Positive for diarrhea.   Genitourinary: Negative.    Musculoskeletal: Positive for arthralgias.        Leg cramps       OBJECTIVE    Temp:  [96.9 °F (36.1 °C)-98.2 °F (36.8 °C)] 96.9 °F (36.1 °C)  Heart Rate:  [] 86  Resp:  [16-20] 20  BP: ()/(52-90) 100/55    MENTAL STATUS EXAM:  Appearance:Casually dressed, good hygeine.   Cooperation:Cooperative  Psychomotor: No psychomotor agitation/retardation, No EPS, No motor tics  Speech-normal rate, amount.  Mood \"irritable\"   Affect-congruent, appropriate, stable  Thought Content-goal directed, no delusional material present  Thought process-linear, organized.  Suicidality: No SI  Homicidality: No HI  Perception: No AH/VH  Insight-fair   Judgement-fair    Lab Results (last 24 hours)     Procedure Component Value Units Date/Time    RPR [[id number]] Collected:  18 1602    Specimen:  Blood Updated:  18 0714     RPR Non Reactive    Narrative:       Performed at:  [address] [place] [address]  [id number]  : Alejandro Oviedo PhD, Phone:  [phone]             Imaging Results (last 24 hours)     ** No results found for the last 24 hours. **             ECG/EMG Results (most recent)     Procedure Component Value Units Date/Time    ECG 12 Lead [[id number]] Collected:  18 1721     Updated:  18 0819    Narrative:       Test Reason : Potential adverse reaction to " medications.  Blood Pressure : **/** mmHG  Vent. Rate : 083 BPM     Atrial Rate : 083 BPM     P-R Int : 144 ms          QRS Dur : 090 ms      QT Int : 380 ms       P-R-T Axes : 044 055 037 degrees     QTc Int : 446 ms    Normal sinus rhythm  Normal ECG  No previous ECGs available  Confirmed by Laura Balderas ([id number]) on 7/12/2018 8:18:55 AM    Referred By:  MANAV           Confirmed By:Laura Balderas           ALLERGIES: Patient has no known allergies.      Current Facility-Administered Medications:   •  buprenorphine (SUBUTEX) SL tablet 8 mg, 8 mg, Sublingual, BID, Sharyn Garcia MD, 8 mg at 07/14/18 0816  •  prenatal vitamin 27-0.8 tablet 1 tablet, 1 tablet, Oral, Daily, Sharyn Garcia MD, 1 tablet at 07/14/18 0816    ASSESSMENT & PLAN:    Active Problems:    Uncomplicated opioid dependence (CMS/HCC)  Assessment: The patient appears stable, though is reporting subjective discomfort  Plan: The patient will be maintained on Subutex 16 mg during her pregnancy and then in about 12 weeks when she delivers the baby, the dose of Subutex will be tapered down.      Benzodiazepine dependence (CMS/HCC)  Assessment: No acute withdrawals noted. Pt reports no hx of withdrawals from benzos.  Plan: Continue to monitor and treat as indicated.      24 weeks gestation of pregnancy  Assessment: Stable  Plan: Prenatal vitamins. Appreciate help by the OB department.    The patient may be discharged to the [facility] if stable- still appears to be in discomfort. Will continue to monitor and possible d/c tomorrow.       Suicide precautions: None    Behavioral Health Treatment Plan and Problem List: I have reviewed and approved the Behavioral Health Treatment Plan and Problem list.  The patient has had a chance to review and agrees with the treatment plan.     Clinician:  Jamal Hanna MD  07/14/18  3:32 PM

## 2018-07-14 NOTE — PLAN OF CARE
Problem: Patient Care Overview  Goal: Plan of Care Review  Outcome: Ongoing (interventions implemented as appropriate)   07/14/18 0651   Coping/Psychosocial   Plan of Care Reviewed With patient   Coping/Psychosocial   Patient Agreement with Plan of Care agrees   Plan of Care Review   Progress improving       Problem: Overarching Goals (Adult)  Goal: Adheres to Safety Considerations for Self and Others  Outcome: Ongoing (interventions implemented as appropriate)    Goal: Optimized Coping Skills in Response to Life Stressors  Outcome: Ongoing (interventions implemented as appropriate)    Goal: Develops/Participates in Therapeutic [place] to Support Successful Transition  Outcome: Ongoing (interventions implemented as appropriate)

## 2018-07-15 VITALS
WEIGHT: 180 LBS | TEMPERATURE: 97.2 F | HEART RATE: 89 BPM | OXYGEN SATURATION: 99 % | RESPIRATION RATE: 18 BRPM | HEIGHT: 65 IN | BODY MASS INDEX: 29.99 KG/M2 | DIASTOLIC BLOOD PRESSURE: 64 MMHG | SYSTOLIC BLOOD PRESSURE: 122 MMHG

## 2018-07-15 PROCEDURE — 99238 HOSP IP/OBS DSCHRG MGMT 30/<: CPT | Performed by: PSYCHIATRY & NEUROLOGY

## 2018-07-15 RX ORDER — PRENATAL VIT/IRON FUM/FOLIC AC 27MG-0.8MG
1 TABLET ORAL DAILY
Qty: 30 TABLET | Refills: 0 | Status: SHIPPED | OUTPATIENT
Start: 2018-07-15

## 2018-07-15 RX ORDER — BUPRENORPHINE HYDROCHLORIDE 8 MG/1
8 TABLET SUBLINGUAL 2 TIMES DAILY
Qty: 13 TABLET | Refills: 0 | Status: SHIPPED | OUTPATIENT
Start: 2018-07-15 | End: 2018-07-20 | Stop reason: SDUPTHER

## 2018-07-15 RX ORDER — POLYETHYLENE GLYCOL 3350 17 G/17G
17 POWDER, FOR SOLUTION ORAL DAILY PRN
Qty: 30 PACKET | Refills: 0 | Status: ON HOLD | OUTPATIENT
Start: 2018-07-15 | End: 2018-10-08 | Stop reason: HOSPADM

## 2018-07-15 RX ADMIN — BUPRENORPHINE HCL 8 MG: 2 TABLET SUBLINGUAL at 08:36

## 2018-07-15 RX ADMIN — PRENATAL VIT W/ FE FUMARATE-FA TAB 27-0.8 MG 1 TABLET: 27-0.8 TAB at 08:36

## 2018-07-15 NOTE — PLAN OF CARE
Problem: Patient Care Overview  Goal: Plan of Care Review  Outcome: Ongoing (interventions implemented as appropriate)   07/15/18 0603   Coping/Psychosocial   Plan of Care Reviewed With patient   Coping/Psychosocial   Patient Agreement with Plan of Care agrees   Plan of Care Review   Progress improving   OTHER   Outcome Summary Pt irritable for assessment on pm shift for 7/14/18. Reports Leg cramps and body aches. Anxiety 8/10, denies depression, cravings 7/10. Pt reports baby is active and reports last fetal movement for around 1700. Pt participated in group. Sleep and appetite are adequate. 7/15/18 0609       Problem: Overarching Goals (Adult)  Goal: Adheres to Safety Considerations for Self and Others  Outcome: Ongoing (interventions implemented as appropriate)    Goal: Optimized Coping Skills in Response to Life Stressors  Outcome: Ongoing (interventions implemented as appropriate)    Goal: Develops/Participates in Therapeutic [place] to Support Successful Transition  Outcome: Ongoing (interventions implemented as appropriate)

## 2018-07-15 NOTE — DISCHARGE SUMMARY
"  Date of Discharge:  7/15/2018    Discharge Diagnosis:Active Problems:    Uncomplicated opioid dependence (CMS/[facility])    Benzodiazepine dependence (CMS/[facility])    24 weeks gestation of pregnancy        Presenting Problem/History of Present Illness  Chemical dependency (CMS/[facility]) [F19.20]     Hospital Course  Patient is a 23 y.o. female presented with Opiate dependency.  She is also presents with 24 week gestation. She was was transferred from [facility] for detox. Her Subutex was continued and later adjusted to 16 mg daily. She was offered PRN medications and supportive care. She participated in groups and offered supportive psychotherapy. Pregnancy remained stable on medical concerns during hospitalization. She was not started on any psychotropics. She did not voice any concerns for SI/HI. Plan for to be discharged back to [facility] for rehab.     DISCHARGE DAY MENTAL STATUS EXAM:   Appearance:Casually dressed, good hygeine.   Cooperation:Cooperative  Psychomotor: No psychomotor agitation/retardation, No EPS, No motor tics  Speech-normal rate, amount.  Mood \"Better\"   Affect-congruent, appropriate, stable  Thought Content-goal directed, no delusional material present  Thought process-linear, organized.  Suicidality: No SI  Homicidality: No HI  Perception: No AH/VH  Insight-fair   Judgement-fair    Consults:   Consults     Date and Time Order Name Status Description    7/11/2018 1829 Inpatient Obstetrics / Gynecology Consult Completed           Pertinent Test Results: labs: UDS positve for Buprenorphine and THC. BHCG positive, all other labs wnl    Condition on Discharge:  improved    Vital Signs  Temp:  [96.9 °F (36.1 °C)-98.5 °F (36.9 °C)] 97.2 °F (36.2 °C)  Heart Rate:  [61-86] 64  Resp:  [18-20] 18  BP: (100-125)/(51-75) 117/65      Discharge Disposition  Home or Self Care    Discharge Medications     Discharge Medications      New Medications      Instructions Start Date   polyethylene glycol pack " packet  Commonly known as:  MIRALAX   17 g, Oral, Daily PRN      prenatal vitamin 27-0.8 27-0.8 MG tablet tablet   1 tablet, Oral, Daily         Changes to Medications      Instructions Start Date   buprenorphine 8 MG sublingual tablet SL tablet  Commonly known as:  SUBUTEX  What changed:  · how much to take  · when to take this   8 mg, Sublingual, 2 Times Daily         Continue These Medications      Instructions Start Date   clonazePAM 1 MG tablet  Commonly known as:  KlonoPIN   1 mg, Oral, 2 Times Daily PRN             Discharge Diet:   Diet Instructions     As tolerated.                   Activity at Discharge:   Activity Instructions     As tolerated.                 Follow-up Appointments  No future appointments.      Test Results Pending at Discharge   Order Current Status    HIV-1 RNA, Quantitative, PCR (graph) In process           Jamal Hanna MD  07/15/18  11:33 AM

## 2018-07-20 RX ORDER — BUPRENORPHINE HYDROCHLORIDE 8 MG/1
8 TABLET SUBLINGUAL 2 TIMES DAILY
Qty: 8 TABLET | Refills: 0 | Status: SHIPPED | OUTPATIENT
Start: 2018-07-20 | End: 2018-07-23 | Stop reason: SDUPTHER

## 2018-07-20 NOTE — TELEPHONE ENCOUNTER
Chery at [facility] Called requesting a Refill for Patient , Patient has a follow dave with Patsy on 7/23/18

## 2018-07-23 ENCOUNTER — OFFICE VISIT (OUTPATIENT)
Dept: PSYCHIATRY | Facility: CLINIC | Age: 23
End: 2018-07-23

## 2018-07-23 VITALS
WEIGHT: 191 LBS | DIASTOLIC BLOOD PRESSURE: 67 MMHG | BODY MASS INDEX: 31.82 KG/M2 | HEART RATE: 85 BPM | SYSTOLIC BLOOD PRESSURE: 116 MMHG | HEIGHT: 65 IN

## 2018-07-23 DIAGNOSIS — F11.20 UNCOMPLICATED OPIOID DEPENDENCE (HCC): Primary | ICD-10-CM

## 2018-07-23 DIAGNOSIS — Z79.899 LONG TERM USE OF DRUG: ICD-10-CM

## 2018-07-23 DIAGNOSIS — F13.21 BENZODIAZEPINE DEPENDENCE IN REMISSION (HCC): ICD-10-CM

## 2018-07-23 DIAGNOSIS — Z3A.26 26 WEEKS GESTATION OF PREGNANCY: ICD-10-CM

## 2018-07-23 LAB
AMPHETAMINE CUT-OFF: 1000
BENZODIAZIPINE CUT-OFF: 300
BUPRENORPHINE CUT-OFF: 10
COCAINE CUT-OFF: 300
EXTERNAL AMPHETAMINE SCREEN URINE: NEGATIVE
EXTERNAL BENZODIAZEPINE SCREEN URINE: NEGATIVE
EXTERNAL BUPRENORPHINE SCREEN URINE: POSITIVE
EXTERNAL COCAINE SCREEN URINE: NEGATIVE
EXTERNAL MDMA: NEGATIVE
EXTERNAL METHADONE SCREEN URINE: NEGATIVE
EXTERNAL METHAMPHETAMINE SCREEN URINE: NEGATIVE
EXTERNAL OPIATES SCREEN URINE: NEGATIVE
EXTERNAL OXYCODONE SCREEN URINE: NEGATIVE
EXTERNAL THC SCREEN URINE: POSITIVE
MDMA CUT-OFF: 500
METHADONE CUT-OFF: 300
METHAMPHETAMINE CUT-OFF: 1000
OPIATES CUT-OFF: 300
OXYCODONE CUT-OFF: 100
THC CUT-OFF: 50

## 2018-07-23 PROCEDURE — 99214 OFFICE O/P EST MOD 30 MIN: CPT | Performed by: NURSE PRACTITIONER

## 2018-07-23 RX ORDER — BUPRENORPHINE HYDROCHLORIDE 8 MG/1
8 TABLET SUBLINGUAL 2 TIMES DAILY
Qty: 28 TABLET | Refills: 0 | Status: SHIPPED | OUTPATIENT
Start: 2018-07-23 | End: 2018-08-07 | Stop reason: SDUPTHER

## 2018-07-23 NOTE — PROGRESS NOTES
"      Subjective   Chinmay Thomas is a 23 y.o. female is here today for medication management follow-up.    Chief Complaint:  \"I am doing OK\"    History of Present Illness:  Patient is a resident of the [facility].  She presents with the manager Purnima King with whom she gives permission to speak in front of.  Patient states that she is stable.  She states that she has an occasional craving but it does not last long and these are not very often.  She denies any current depression.  States that she has occasional anxiety however this is more situational related.  She states she is sleeping several hours a night without difficulty.Body mass index is 31.78 kg/m².  Appetite has increased.  She continues with counseling and educational classes at the [facility].  [facility] manager Purnima states that patient is doing much better this week.  States patient was highly abusing the Suboxone when she first came into the house and was doing more than 24 mg a day.  She has been maintained on 16 mg total dose since going through detox.    The following portions of the patient's history were reviewed and updated as appropriate: allergies, current medications, past family history, past medical history, past social history, past surgical history and problem list.    Review of Systems   Constitutional: Negative for activity change, appetite change and fatigue.   HENT: Negative.    Eyes: Negative for visual disturbance.   Respiratory: Negative.    Cardiovascular: Negative.    Gastrointestinal: Negative for nausea.   Endocrine: Negative.    Genitourinary: Negative.    Musculoskeletal: Negative for arthralgias.   Skin: Negative.    Allergic/Immunologic: Negative.    Neurological: Negative for dizziness, seizures and headaches.   Hematological: Negative.    Psychiatric/Behavioral: Positive for sleep disturbance. Negative for agitation, behavioral problems, confusion, decreased concentration, " "dysphoric mood, hallucinations, self-injury and suicidal ideas. The patient is not nervous/anxious and is not hyperactive.        Objective   Physical Exam   Constitutional: She appears well-developed and well-nourished.   Psychiatric: She has a normal mood and affect. Her speech is normal and behavior is normal. Judgment and thought content normal. Cognition and memory are normal.   Nursing note and vitals reviewed.    Blood pressure 116/67, pulse 85, height 165.1 cm (65\"), weight 86.6 kg (191 lb), not currently breastfeeding.    Medication List:   Current Outpatient Prescriptions   Medication Sig Dispense Refill   • buprenorphine (SUBUTEX) 8 MG sublingual tablet SL tablet Place 1 tablet under the tongue 2 (Two) Times a Day. 28 tablet 0   • polyethylene glycol (MIRALAX) packet Take 17 g by mouth Daily As Needed (constipation). 30 packet 0   • Prenatal Vit-Fe Fumarate-FA (PRENATAL VITAMIN 27-0.8) 27-0.8 MG tablet tablet Take 1 tablet by mouth Daily. 30 tablet 0     No current facility-administered medications for this visit.        Mental Status Exam:   Hygiene:   good  Cooperation:  Cooperative  Eye Contact:  Good  Psychomotor Behavior:  Appropriate  Affect:  Full range  Hopelessness: Denies  Speech:  Normal  Thought Process:  Goal directed  Thought Content:  Normal  Suicidal:  None  Homicidal:  None  Hallucinations:  None  Delusion:  None  Memory:  Intact  Orientation:  Person, Place, Time and Situation  Reliability:  good  Insight:  Good  Judgement:  Fair  Impulse Control:  Fair  Physical/Medical Issues:  Yes pregnancy    Assessment/Plan   Problems Addressed this Visit     Uncomplicated opioid dependence (CMS/HCC) - Primary    Relevant Medications    buprenorphine (SUBUTEX) 8 MG sublingual tablet SL tablet      Other Visit Diagnoses     Long term use of drug        Relevant Orders    [employer] Drug Screen (Completed)    26 weeks gestation of pregnancy        Relevant Medications    buprenorphine (SUBUTEX) 8 MG " sublingual tablet SL tablet    Benzodiazepine dependence in remission (CMS/HCC)            Chin reviewed.  UDS positive for THC which was reported use before detox.  Positive for buprenorphine.   Functionality: pt having minimal impairment in important areas of daily functioning.  Prognosis: Guarded dependent on medication/follow up and treatment plan compliance.    Patient is to continue her counseling and educational classes at the [facility].  Manager states that they are subjected to frequent urine drug screens.  She is currently stable on her present dose of Suboxone and therefore I'm continuing this dose at present.  Patient has an appointment upcoming with gynecology at [facility] to follow her pregnancy.  She is aware if she were to develop any symptoms of depression or any suicidal thoughts she is to notify me immediately.  She states she has come a long way and that she used to use opioids intravenously.  She has much desire to maintain sobriety.  Reviewed the risks, benefits, and side effects of the medications; patient acknowledged and verbally consented.  Patient is agreeable to call the [facility].  Patient is aware to call 911 or go to the nearest ER should begin having SI/HI.

## 2018-07-27 LAB
HIV1 RNA # SERPL NAA+PROBE: <20 COPIES/ML
LOG10 HIV-1 RNA: NORMAL LOG10COPY/ML

## 2018-08-07 ENCOUNTER — OFFICE VISIT (OUTPATIENT)
Dept: PSYCHIATRY | Facility: CLINIC | Age: 23
End: 2018-08-07

## 2018-08-07 VITALS
HEART RATE: 92 BPM | WEIGHT: 197.8 LBS | BODY MASS INDEX: 32.96 KG/M2 | HEIGHT: 65 IN | DIASTOLIC BLOOD PRESSURE: 73 MMHG | SYSTOLIC BLOOD PRESSURE: 115 MMHG

## 2018-08-07 DIAGNOSIS — Z3A.32 32 WEEKS GESTATION OF PREGNANCY: ICD-10-CM

## 2018-08-07 DIAGNOSIS — Z3A.26 26 WEEKS GESTATION OF PREGNANCY: ICD-10-CM

## 2018-08-07 DIAGNOSIS — F11.20 UNCOMPLICATED OPIOID DEPENDENCE (HCC): Primary | ICD-10-CM

## 2018-08-07 PROCEDURE — 99214 OFFICE O/P EST MOD 30 MIN: CPT | Performed by: NURSE PRACTITIONER

## 2018-08-07 RX ORDER — BUPRENORPHINE HYDROCHLORIDE 8 MG/1
8 TABLET SUBLINGUAL 2 TIMES DAILY
Qty: 14 TABLET | Refills: 0 | Status: SHIPPED | OUTPATIENT
Start: 2018-08-07 | End: 2018-08-13 | Stop reason: SDUPTHER

## 2018-08-07 NOTE — PROGRESS NOTES
"      Subjective   Chinmay Thomas is a 23 y.o. female is here today for medication management follow-up. Presents with Purnima from the [facility] with whom she gives permission to speak in front of.     Chief Complaint: \"I am doing pretty good\"  History of Present Illness:  Patient states she is doing good still.  She states that she will have some cravings and between the dosings however she states she is dealing with it.  She denies any current depression.  Anxiety is stable.  She is sleeping several hours a night however it is restless but that is due more to the uncomfortable body habitus of pregnancy.  There are no new medical stressors.Body mass index is 32.92 kg/m².  She is not having any negative side effects to the medication.  The  Purnima states that patient seems to be doing well and is learning coping skills with anxious situations.    The following portions of the patient's history were reviewed and updated as appropriate: allergies, current medications, past family history, past medical history, past social history, past surgical history and problem list.    Review of Systems   Constitutional: Negative for activity change, appetite change and fatigue.   HENT: Negative.    Eyes: Negative for visual disturbance.   Respiratory: Negative.    Cardiovascular: Negative.    Gastrointestinal: Negative for nausea.   Endocrine: Negative.    Genitourinary: Negative.    Musculoskeletal: Negative for arthralgias.   Skin: Negative.    Allergic/Immunologic: Negative.    Neurological: Negative for dizziness, seizures and headaches.   Hematological: Negative.    Psychiatric/Behavioral: Negative for agitation, behavioral problems, confusion, decreased concentration, dysphoric mood, hallucinations, self-injury and suicidal ideas. The patient is not nervous/anxious and is not hyperactive.        Objective   Physical Exam   Constitutional: She appears well-developed and well-nourished. " "  Psychiatric: She has a normal mood and affect. Her speech is normal and behavior is normal. Judgment and thought content normal. Cognition and memory are normal.   Nursing note and vitals reviewed.    Blood pressure 115/73, pulse 92, height 165.1 cm (65\"), weight 89.7 kg (197 lb 12.8 oz), not currently breastfeeding.    Medication List:   Current Outpatient Prescriptions   Medication Sig Dispense Refill   • buprenorphine (SUBUTEX) 8 MG sublingual tablet SL tablet Place 1 tablet under the tongue 2 (Two) Times a Day. 28 tablet 0   • polyethylene glycol (MIRALAX) packet Take 17 g by mouth Daily As Needed (constipation). 30 packet 0   • Prenatal Vit-Fe Fumarate-FA (PRENATAL VITAMIN 27-0.8) 27-0.8 MG tablet tablet Take 1 tablet by mouth Daily. 30 tablet 0     No current facility-administered medications for this visit.        Mental Status Exam:   Hygiene:   good  Cooperation:  Cooperative  Eye Contact:  Good  Psychomotor Behavior:  Appropriate  Affect:  Full range  Hopelessness: Denies  Speech:  Normal  Thought Process:  Goal directed  Thought Content:  Normal  Suicidal:  None  Homicidal:  None  Hallucinations:  None  Delusion:  None  Memory:  Intact  Orientation:  Person, Place, Time and Situation  Reliability:  good  Insight:  Good  Judgement:  Fair  Impulse Control:  Fair  Physical/Medical Issues:  Yes pregnancy    Assessment/Plan   Problems Addressed this Visit     Uncomplicated opioid dependence (CMS/HCC) - Primary      Other Visit Diagnoses     32 weeks gestation of pregnancy            Functionality: pt having minimal impairment in important areas of daily functioning.  Prognosis: Guarded dependent on medication/follow up and treatment plan compliance.    Patient is to continue her counseling and educational classes at the [facility].  Patient's  is here with her.  She states that they get both group and individual therapy sessions.  They also attend 2 12-step meetings a week.  They also " have educational classes and also classes pertaining to the situation such as domestic violence, parenting etc.  They also perform random urine drug screens monthly.  Will have patient back in 2 weeks to assess and see how she is doing.  We may consider trying to come down on the dose of Suboxone prior to her delivering.  We will see how her symptoms are at that time.  Reviewed the risks, benefits, and side effects of the medications; patient acknowledged and verbally consented.  Patient is agreeable to call the [facility].  Patient is aware to call 911 or go to the nearest ER should begin having SI/HI.

## 2018-08-13 ENCOUNTER — TELEPHONE (OUTPATIENT)
Dept: PSYCHIATRY | Facility: CLINIC | Age: 23
End: 2018-08-13

## 2018-08-13 DIAGNOSIS — F11.20 UNCOMPLICATED OPIOID DEPENDENCE (HCC): ICD-10-CM

## 2018-08-13 DIAGNOSIS — Z3A.26 26 WEEKS GESTATION OF PREGNANCY: ICD-10-CM

## 2018-08-13 RX ORDER — BUPRENORPHINE HYDROCHLORIDE 8 MG/1
8 TABLET SUBLINGUAL 2 TIMES DAILY
Qty: 18 TABLET | Refills: 0 | Status: SHIPPED | OUTPATIENT
Start: 2018-08-13 | End: 2018-08-22 | Stop reason: SDUPTHER

## 2018-08-13 RX ORDER — PRENATAL VIT/IRON FUM/FOLIC AC 27MG-0.8MG
1 TABLET ORAL DAILY
Qty: 30 TABLET | Refills: 0 | Status: CANCELLED | OUTPATIENT
Start: 2018-08-13

## 2018-08-13 NOTE — TELEPHONE ENCOUNTER
Purnima From [facility] called the office this morning stating the Pharmacy has not Received Chinmay's  Subutex

## 2018-08-20 RX ORDER — PRENATAL VIT/IRON FUM/FOLIC AC 27MG-0.8MG
1 TABLET ORAL DAILY
Qty: 30 TABLET | Refills: 0 | Status: CANCELLED | OUTPATIENT
Start: 2018-08-20

## 2018-08-22 ENCOUNTER — OFFICE VISIT (OUTPATIENT)
Dept: PSYCHIATRY | Facility: CLINIC | Age: 23
End: 2018-08-22

## 2018-08-22 VITALS
SYSTOLIC BLOOD PRESSURE: 117 MMHG | HEART RATE: 106 BPM | DIASTOLIC BLOOD PRESSURE: 73 MMHG | HEIGHT: 65 IN | WEIGHT: 197.4 LBS | BODY MASS INDEX: 32.89 KG/M2

## 2018-08-22 DIAGNOSIS — Z3A.32 32 WEEKS GESTATION OF PREGNANCY: ICD-10-CM

## 2018-08-22 DIAGNOSIS — Z3A.26 26 WEEKS GESTATION OF PREGNANCY: ICD-10-CM

## 2018-08-22 DIAGNOSIS — F11.20 UNCOMPLICATED OPIOID DEPENDENCE (HCC): Primary | ICD-10-CM

## 2018-08-22 PROCEDURE — 99214 OFFICE O/P EST MOD 30 MIN: CPT | Performed by: NURSE PRACTITIONER

## 2018-08-22 RX ORDER — BUPRENORPHINE HYDROCHLORIDE 8 MG/1
8 TABLET SUBLINGUAL 2 TIMES DAILY
Qty: 56 TABLET | Refills: 0 | Status: SHIPPED | OUTPATIENT
Start: 2018-08-22 | End: 2018-09-19 | Stop reason: SDUPTHER

## 2018-08-22 NOTE — PROGRESS NOTES
"      Subjective   Chinmay Thomas is a 23 y.o. female is here today for medication management follow-up.Chief Complaint: \"I am still doing good  History of Present Illness:  Patient presents for her medication follow-up.  She states she is still doing well.  She denies any depression or anxiety.  She states she is sleeping about 7 hours at night it is restless but that is more due to the body habitus of pregnancy.  She denies having any cravings.Body mass index is 32.85 kg/m².  She denies any significant appetite changes.  She denies any suicidal thoughts, homicidal thoughts, or any AV hallucinations.  She states she is doing really well at the [facility] and is liking everything about it.  She is attending regular educational meetings and AA meetings as well.   TThe following portions of the patient's history were reviewed and updated as appropriate: allergies, current medications, past family history, past medical history, past social history, past surgical history and problem list.    Review of Systems   Constitutional: Negative for activity change, appetite change and fatigue.   HENT: Negative.    Eyes: Negative for visual disturbance.   Respiratory: Negative.    Cardiovascular: Negative.    Gastrointestinal: Negative for nausea.   Endocrine: Negative.    Genitourinary: Negative.    Musculoskeletal: Negative for arthralgias.   Skin: Negative.    Allergic/Immunologic: Negative.    Neurological: Negative for dizziness, seizures and headaches.   Hematological: Negative.    Psychiatric/Behavioral: Negative for agitation, behavioral problems, confusion, decreased concentration, dysphoric mood, hallucinations, self-injury and suicidal ideas. The patient is not nervous/anxious and is not hyperactive.        Objective   Physical Exam   Constitutional: She appears well-developed and well-nourished.   Psychiatric: She has a normal mood and affect. Her speech is normal and behavior is normal. Judgment and " "thought content normal. Cognition and memory are normal.   Nursing note and vitals reviewed.    Blood pressure 117/73, pulse 106, height 165.1 cm (65\"), weight 89.5 kg (197 lb 6.4 oz), not currently breastfeeding.    Medication List:   Current Outpatient Prescriptions   Medication Sig Dispense Refill   • buprenorphine (SUBUTEX) 8 MG sublingual tablet SL tablet Dissolve 1 tablet under the tongue 2 (Two) Times a Day. 18 tablet 0   • polyethylene glycol (MIRALAX) packet Take 17 g by mouth Daily As Needed (constipation). 30 packet 0   • Prenatal Vit-Fe Fumarate-FA (PRENATAL VITAMIN 27-0.8) 27-0.8 MG tablet tablet Take 1 tablet by mouth Daily. 30 tablet 0     No current facility-administered medications for this visit.        Mental Status Exam:   Hygiene:   good  Cooperation:  Cooperative  Eye Contact:  Good  Psychomotor Behavior:  Appropriate  Affect:  Full range  Hopelessness: Denies  Speech:  Normal  Thought Process:  Goal directed  Thought Content:  Normal  Suicidal:  None  Homicidal:  None  Hallucinations:  None  Delusion:  None  Memory:  Intact  Orientation:  Person, Place, Time and Situation  Reliability:  good  Insight:  Good  Judgement:  Fair  Impulse Control:  Fair  Physical/Medical Issues:  Yes pregnancy    Assessment/Plan   Problems Addressed this Visit     Uncomplicated opioid dependence (CMS/HCC) - Primary      Other Visit Diagnoses     32 weeks gestation of pregnancy            Functionality: pt having minimal impairment in important areas of daily functioning.  Prognosis: Guarded dependent on medication/follow up and treatment plan compliance.  Chin reviewed.  UDS performed today and pending.   Patient is to continue her counseling and educational classes at the [facility].    Reviewed the risks, benefits, and side effects of the medications; patient acknowledged and verbally consented.  Patient is agreeable to call the [facility].  Patient is aware to call 911 or go to the nearest ER " should begin having SI/HI. RTC 28 days

## 2018-08-23 ENCOUNTER — PRIOR AUTHORIZATION (OUTPATIENT)
Dept: PSYCHIATRY | Facility: CLINIC | Age: 23
End: 2018-08-23

## 2018-09-04 ENCOUNTER — HOSPITAL ENCOUNTER (OUTPATIENT)
Facility: HOSPITAL | Age: 23
Discharge: HOME | End: 2018-09-04
Attending: OBSTETRICS & GYNECOLOGY | Admitting: OBSTETRICS & GYNECOLOGY

## 2018-09-04 VITALS
RESPIRATION RATE: 20 BRPM | HEART RATE: 113 BPM | TEMPERATURE: 97.6 F | SYSTOLIC BLOOD PRESSURE: 123 MMHG | DIASTOLIC BLOOD PRESSURE: 60 MMHG

## 2018-09-04 PROBLEM — O26.893 ABDOMINAL PAIN DURING PREGNANCY IN THIRD TRIMESTER: Status: ACTIVE | Noted: 2018-09-04

## 2018-09-04 PROBLEM — R10.9 ABDOMINAL PAIN DURING PREGNANCY IN THIRD TRIMESTER: Status: ACTIVE | Noted: 2018-09-04

## 2018-09-04 LAB
6-ACETYL MORPHINE: NEGATIVE
AMPHET+METHAMPHET UR QL: NEGATIVE
BARBITURATES UR QL SCN: NEGATIVE
BASOPHILS # BLD AUTO: 0.01 10*3/MM3 (ref 0–0.3)
BASOPHILS NFR BLD AUTO: 0.1 % (ref 0–2)
BENZODIAZ UR QL SCN: NEGATIVE
BILIRUB UR QL STRIP: NEGATIVE
BUPRENORPHINE SERPL-MCNC: POSITIVE NG/ML
CANNABINOIDS SERPL QL: NEGATIVE
CLARITY UR: CLEAR
COCAINE UR QL: NEGATIVE
COLOR UR: YELLOW
DEPRECATED RDW RBC AUTO: 42.9 FL (ref 37–54)
EOSINOPHIL # BLD AUTO: 0.06 10*3/MM3 (ref 0–0.7)
EOSINOPHIL NFR BLD AUTO: 0.5 % (ref 0–5)
ERYTHROCYTE [DISTWIDTH] IN BLOOD BY AUTOMATED COUNT: 13.7 % (ref 11.5–14.5)
GLUCOSE UR STRIP-MCNC: NEGATIVE MG/DL
HCT VFR BLD AUTO: 36.9 % (ref 37–47)
HGB BLD-MCNC: 12.4 G/DL (ref 12–16)
HGB UR QL STRIP.AUTO: NEGATIVE
IMM GRANULOCYTES # BLD: 0.16 10*3/MM3 (ref 0–0.03)
IMM GRANULOCYTES NFR BLD: 1.4 % (ref 0–0.5)
KETONES UR QL STRIP: NEGATIVE
LEUKOCYTE ESTERASE UR QL STRIP.AUTO: NEGATIVE
LYMPHOCYTES # BLD AUTO: 3.35 10*3/MM3 (ref 1–3)
LYMPHOCYTES NFR BLD AUTO: 28.3 % (ref 21–51)
MCH RBC QN AUTO: 29.1 PG (ref 27–33)
MCHC RBC AUTO-ENTMCNC: 33.6 G/DL (ref 33–37)
MCV RBC AUTO: 86.6 FL (ref 80–94)
METHADONE UR QL SCN: NEGATIVE
MONOCYTES # BLD AUTO: 0.87 10*3/MM3 (ref 0.1–0.9)
MONOCYTES NFR BLD AUTO: 7.4 % (ref 0–10)
NEUTROPHILS # BLD AUTO: 7.38 10*3/MM3 (ref 1.4–6.5)
NEUTROPHILS NFR BLD AUTO: 62.3 % (ref 30–70)
NITRITE UR QL STRIP: NEGATIVE
OPIATES UR QL: NEGATIVE
OXYCODONE UR QL SCN: NEGATIVE
PCP UR QL SCN: NEGATIVE
PH UR STRIP.AUTO: 7.5 [PH] (ref 5–8)
PLATELET # BLD AUTO: 251 10*3/MM3 (ref 130–400)
PMV BLD AUTO: 9.9 FL (ref 6–10)
PROT UR QL STRIP: NEGATIVE
RBC # BLD AUTO: 4.26 10*6/MM3 (ref 4.2–5.4)
SP GR UR STRIP: 1.01 (ref 1–1.03)
UROBILINOGEN UR QL STRIP: NORMAL
WBC NRBC COR # BLD: 11.83 10*3/MM3 (ref 4.5–12.5)

## 2018-09-04 PROCEDURE — 87086 URINE CULTURE/COLONY COUNT: CPT | Performed by: OBSTETRICS & GYNECOLOGY

## 2018-09-04 PROCEDURE — 80307 DRUG TEST PRSMV CHEM ANLYZR: CPT | Performed by: OBSTETRICS & GYNECOLOGY

## 2018-09-04 PROCEDURE — 85025 COMPLETE CBC W/AUTO DIFF WBC: CPT | Performed by: OBSTETRICS & GYNECOLOGY

## 2018-09-04 PROCEDURE — 81003 URINALYSIS AUTO W/O SCOPE: CPT | Performed by: OBSTETRICS & GYNECOLOGY

## 2018-09-04 PROCEDURE — 59025 FETAL NON-STRESS TEST: CPT

## 2018-09-04 PROCEDURE — G0463 HOSPITAL OUTPT CLINIC VISIT: HCPCS

## 2018-09-04 PROCEDURE — P9612 CATHETERIZE FOR URINE SPEC: HCPCS

## 2018-09-04 RX ORDER — BUPRENORPHINE HYDROCHLORIDE 8 MG/1
1 TABLET SUBLINGUAL EVERY 12 HOURS
Status: ON HOLD | COMMUNITY
Start: 2018-08-09 | End: 2018-09-04

## 2018-09-04 NOTE — NON STRESS TEST
Chinmay Thomas, a  at 34w1d with an JANETTE of 10/15/2018, by Patient Reported, was seen at [facility] for a nonstress test.    Chief Complaint   Patient presents with   • Non-stress Test     PRESENTS WITH C/O'S OF LOWER BACK PAIN AND LOWER ABD PAIN.  PT DENIES ANY VAG BLEEDING AND HAS +FM.       Patient Active Problem List   Diagnosis   • Uncomplicated opioid dependence (CMS/HCC)   • Benzodiazepine dependence (CMS/HCC)   • 24 weeks gestation of pregnancy   • Abdominal pain during pregnancy in third trimester       Start Time: 1415  Stop Time: 1435    Interpretation A  Nonstress Test Interpretation A: Reactive (18 1443 : Jenifer Crowley, RN)  Comments A: VERIFED BY KJ DUARTE RN (18 1443 : Jenifer Crowley, RN)

## 2018-09-06 LAB — BACTERIA SPEC AEROBE CULT: NO GROWTH

## 2018-09-19 ENCOUNTER — OFFICE VISIT (OUTPATIENT)
Dept: PSYCHIATRY | Facility: CLINIC | Age: 23
End: 2018-09-19

## 2018-09-19 VITALS
SYSTOLIC BLOOD PRESSURE: 111 MMHG | WEIGHT: 202.4 LBS | DIASTOLIC BLOOD PRESSURE: 71 MMHG | HEIGHT: 65 IN | HEART RATE: 96 BPM | BODY MASS INDEX: 33.72 KG/M2

## 2018-09-19 DIAGNOSIS — Z3A.26 26 WEEKS GESTATION OF PREGNANCY: ICD-10-CM

## 2018-09-19 DIAGNOSIS — F11.20 UNCOMPLICATED OPIOID DEPENDENCE (HCC): Primary | ICD-10-CM

## 2018-09-19 DIAGNOSIS — Z3A.37 37 WEEKS GESTATION OF PREGNANCY: ICD-10-CM

## 2018-09-19 LAB — EXTERNAL GROUP B STREP ANTIGEN: NEGATIVE

## 2018-09-19 PROCEDURE — 99214 OFFICE O/P EST MOD 30 MIN: CPT | Performed by: NURSE PRACTITIONER

## 2018-09-19 RX ORDER — BUPRENORPHINE HYDROCHLORIDE 8 MG/1
8 TABLET SUBLINGUAL 2 TIMES DAILY
Qty: 28 TABLET | Refills: 0 | Status: SHIPPED | OUTPATIENT
Start: 2018-09-19 | End: 2018-10-03 | Stop reason: SDUPTHER

## 2018-09-19 NOTE — PROGRESS NOTES
Subjective   Chinmay Thomas is a 23 y.o. female is here today for medication management follow-up     Chief Complaint:Recheck on buprenorphine  History of Present Illness:  Presents for follow-up at the [facility].  2nd Watch  Purnima is with her in the waiting room however did not accompany her into the appointment.  Has some anxiety but able to control it.  She denies any depression.  She denies any suicidal thoughts, homicidal thoughts, or any AV hallucinations.  She states she has had a couple of triggers lately in a person was rolling up a Post-it note and it made her have some slight cravings however she states she went and laid down and they eventually subsided.  She is still in the 2nd Watch [place] in pregnancy is progressing without any difficulty.  She is sleeping as well as she can with the later stages of pregnancy.Body mass index is 33.68 kg/m².  Her appetite is good.  She denies any previous problems with postpartum depression.     TThe following portions of the patient's history were reviewed and updated as appropriate: allergies, current medications, past family history, past medical history, past social history, past surgical history and problem list.    Review of Systems   Constitutional: Negative for activity change, appetite change and fatigue.   HENT: Negative.    Eyes: Negative for visual disturbance.   Respiratory: Negative.    Cardiovascular: Negative.    Gastrointestinal: Negative for nausea.   Endocrine: Negative.    Genitourinary: Negative.    Musculoskeletal: Negative for arthralgias.   Skin: Negative.    Allergic/Immunologic: Negative.    Neurological: Negative for dizziness, seizures and headaches.   Hematological: Negative.    Psychiatric/Behavioral: Positive for sleep disturbance. Negative for agitation, behavioral problems, confusion, decreased concentration, dysphoric mood, hallucinations, self-injury and suicidal ideas. The patient is  "nervous/anxious. The patient is not hyperactive.        Objective   Physical Exam   Constitutional: She is oriented to person, place, and time. She appears well-developed and well-nourished.   Neurological: She is alert and oriented to person, place, and time.   Psychiatric: She has a normal mood and affect. Her speech is normal and behavior is normal. Judgment and thought content normal. Cognition and memory are normal.   Nursing note and vitals reviewed.    Blood pressure 111/71, pulse 96, height 165.1 cm (65\"), weight 91.8 kg (202 lb 6.4 oz), not currently breastfeeding.    Medication List:   Current Outpatient Prescriptions   Medication Sig Dispense Refill   • buprenorphine (SUBUTEX) 8 MG sublingual tablet SL tablet Dissolve 1 tablet under the tongue 2 (Two) Times a Day. 28 tablet 0   • polyethylene glycol (MIRALAX) packet Take 17 g by mouth Daily As Needed (constipation). 30 packet 0   • Prenatal Vit-Fe Fumarate-FA (PRENATAL VITAMIN 27-0.8) 27-0.8 MG tablet tablet Take 1 tablet by mouth Daily. 30 tablet 0     No current facility-administered medications for this visit.        Mental Status Exam:   Hygiene:   good  Cooperation:  Cooperative  Eye Contact:  Good  Psychomotor Behavior:  Appropriate  Affect:  Full range  Hopelessness: Denies  Speech:  Normal  Thought Process:  Goal directed  Thought Content:  Normal  Suicidal:  None  Homicidal:  None  Hallucinations:  None  Delusion:  None  Memory:  Intact  Orientation:  Person, Place, Time and Situation  Reliability:  good  Insight:  Good  Judgement:  Fair  Impulse Control:  Fair  Physical/Medical Issues:  Yes pregnancy    Assessment/Plan   Problems Addressed this Visit     Uncomplicated opioid dependence (CMS/HCC) - Primary    Relevant Medications    buprenorphine (SUBUTEX) 8 MG sublingual tablet SL tablet      Other Visit Diagnoses     37 weeks gestation of pregnancy        Relevant Medications    buprenorphine (SUBUTEX) 8 MG sublingual tablet SL tablet    26 " weeks gestation of pregnancy            Functionality: pt having minimal impairment in important areas of daily functioning.  Prognosis: Guarded dependent on medication/follow up and treatment plan compliance.  Chin reviewed.  UDS reviewed from last visit.  Continues to get UDS at the [facility].    Patient is to continue her counseling and educational classes at the [facility].    Reviewed the risks, benefits, and side effects of the medications; patient acknowledged and verbally consented.  Patient is agreeable to call the [facility].  Patient is aware to call 911 or go to the nearest ER should begin having SI/HI.  With patient being close to delivery I'm going to have her back in 2 weeks to reassess and make sure things are still going smoothly.  RTC 14 days

## 2018-10-03 ENCOUNTER — OFFICE VISIT (OUTPATIENT)
Dept: PSYCHIATRY | Facility: CLINIC | Age: 23
End: 2018-10-03

## 2018-10-03 VITALS
DIASTOLIC BLOOD PRESSURE: 77 MMHG | HEIGHT: 65 IN | BODY MASS INDEX: 34.02 KG/M2 | SYSTOLIC BLOOD PRESSURE: 119 MMHG | HEART RATE: 98 BPM | WEIGHT: 204.2 LBS

## 2018-10-03 DIAGNOSIS — F11.20 UNCOMPLICATED OPIOID DEPENDENCE (HCC): Primary | ICD-10-CM

## 2018-10-03 DIAGNOSIS — Z3A.39 39 WEEKS GESTATION OF PREGNANCY: ICD-10-CM

## 2018-10-03 PROCEDURE — 99214 OFFICE O/P EST MOD 30 MIN: CPT | Performed by: NURSE PRACTITIONER

## 2018-10-03 RX ORDER — BUPRENORPHINE HYDROCHLORIDE 8 MG/1
8 TABLET SUBLINGUAL 2 TIMES DAILY
Qty: 42 TABLET | Refills: 0 | Status: SHIPPED | OUTPATIENT
Start: 2018-10-03 | End: 2018-10-24

## 2018-10-03 NOTE — PROGRESS NOTES
Subjective   Chinmay Thomas is a 23 y.o. female is here today for medication management follow-up     Chief Complaint: Recheck on MAT pregnancy    History of Present Illness:  Patient presents by herself for her follow-up appointment at the [facility] behavioral clinic.  She is 39 weeks gestation and is not for sure when she will deliver.  She states she is doing well.  She denies any cravings.  She has some anxiety but this is related to upcoming childbirth.  She denies any depression.  She is sleeping very restlessly but this is due more to the body habitus of pregnancy.  She denies any mood swings.  She is planning on filing for an extension to stay in the [facility].  She is very excited about the upcoming delivery of her child.     TThe following portions of the patient's history were reviewed and updated as appropriate: allergies, current medications, past family history, past medical history, past social history, past surgical history and problem list.    Review of Systems   Constitutional: Negative for activity change, appetite change and fatigue.   HENT: Negative.    Eyes: Negative for visual disturbance.   Respiratory: Negative.    Cardiovascular: Negative.    Gastrointestinal: Negative for nausea.   Endocrine: Negative.    Genitourinary: Negative.    Musculoskeletal: Negative for arthralgias.   Skin: Negative.    Allergic/Immunologic: Negative.    Neurological: Negative for dizziness, seizures and headaches.   Hematological: Negative.    Psychiatric/Behavioral: Positive for sleep disturbance. Negative for agitation, behavioral problems, confusion, decreased concentration, dysphoric mood, hallucinations, self-injury and suicidal ideas. The patient is nervous/anxious. The patient is not hyperactive.        Objective   Physical Exam   Constitutional: She is oriented to person, place, and time. She appears well-developed and well-nourished.   Neurological: She is alert and oriented to person,  "place, and time.   Psychiatric: She has a normal mood and affect. Her speech is normal and behavior is normal. Judgment and thought content normal. Cognition and memory are normal.   Nursing note and vitals reviewed.    Blood pressure 119/77, pulse 98, height 165.1 cm (65\"), weight 92.6 kg (204 lb 3.2 oz), not currently breastfeeding.    Medication List:   Current Outpatient Prescriptions   Medication Sig Dispense Refill   • buprenorphine (SUBUTEX) 8 MG sublingual tablet SL tablet Dissolve 1 tablet under the tongue 2 (Two) Times a Day. 42 tablet 0   • polyethylene glycol (MIRALAX) packet Take 17 g by mouth Daily As Needed (constipation). 30 packet 0   • polyethylene glycol (MIRALAX) powder Mix 17 grams (1 capful) with 8 oz. water, juice, soda, coffee or tea and drink every day. 527 g 0   • Prenatal Vit-Fe Fumarate-FA (PRENATAL VITAMIN 27-0.8) 27-0.8 MG tablet tablet Take 1 tablet by mouth Daily. 30 tablet 0     No current facility-administered medications for this visit.        Mental Status Exam:   Hygiene:   good  Cooperation:  Cooperative  Eye Contact:  Good  Psychomotor Behavior:  Appropriate  Affect:  Full range  Hopelessness: Denies  Speech:  Normal  Thought Process:  Goal directed  Thought Content:  Normal  Suicidal:  None  Homicidal:  None  Hallucinations:  None  Delusion:  None  Memory:  Intact  Orientation:  Person, Place, Time and Situation  Reliability:  good  Insight:  Good  Judgement:  Fair  Impulse Control:  Fair  Physical/Medical Issues:  Yes pregnancy    Assessment/Plan   Problems Addressed this Visit     Uncomplicated opioid dependence (CMS/HCC) - Primary    Relevant Medications    buprenorphine (SUBUTEX) 8 MG sublingual tablet SL tablet      Other Visit Diagnoses     39 weeks gestation of pregnancy        Relevant Medications    buprenorphine (SUBUTEX) 8 MG sublingual tablet SL tablet        Functionality: pt having minimal impairment in important areas of daily functioning.  Prognosis: Guarded " dependent on medication/follow up and treatment plan compliance.     Patient is to continue her counseling and educational classes at the [facility].    Continue with UDS at the [facility].  We had a very lengthy discussion today regarding the plan after the baby is born.  Patient would like to continue with me and a.m. happy to continue seeing her.  Discussed that the typical treatment postpartum needs to be one year if not 2.  At some point we would start a slow taper but I told her that we would address this as we both decided on how things were going and the stability of her surroundings.  Patient does not know yet where she will be as I mentioned earlier she is planning on following an extension through the [facility].  Reviewed the risks, benefits, and side effects of the medications; patient acknowledged and verbally consented.  Patient is agreeable to call the [facility].  Patient is aware to call 911 or go to the nearest ER should begin having SI/HI.  Not sure when patient will deliver but I'm also going to be out of town October 16 or 20th so I'm scheduling patient back in 3 weeks in giving her enough medication until then.

## 2018-10-07 ENCOUNTER — HOSPITAL ENCOUNTER (INPATIENT)
Facility: HOSPITAL | Age: 23
LOS: 2 days | Discharge: HOME | End: 2018-10-10
Attending: OBSTETRICS & GYNECOLOGY | Admitting: OBSTETRICS & GYNECOLOGY

## 2018-10-07 PROBLEM — Z34.90 PREGNANCY: Status: ACTIVE | Noted: 2018-10-07

## 2018-10-07 LAB
6-ACETYL MORPHINE: NEGATIVE
AMPHET+METHAMPHET UR QL: NEGATIVE
BARBITURATES UR QL SCN: NEGATIVE
BENZODIAZ UR QL SCN: NEGATIVE
BUPRENORPHINE SERPL-MCNC: POSITIVE NG/ML
CANNABINOIDS SERPL QL: NEGATIVE
COCAINE UR QL: NEGATIVE
METHADONE UR QL SCN: NEGATIVE
OPIATES UR QL: NEGATIVE
OXYCODONE UR QL SCN: NEGATIVE
PCP UR QL SCN: NEGATIVE

## 2018-10-07 PROCEDURE — 80307 DRUG TEST PRSMV CHEM ANLYZR: CPT | Performed by: OBSTETRICS & GYNECOLOGY

## 2018-10-08 ENCOUNTER — ANESTHESIA (OUTPATIENT)
Dept: LABOR AND DELIVERY | Facility: HOSPITAL | Age: 23
End: 2018-10-08

## 2018-10-08 ENCOUNTER — ANESTHESIA EVENT (OUTPATIENT)
Dept: LABOR AND DELIVERY | Facility: HOSPITAL | Age: 23
End: 2018-10-08

## 2018-10-08 PROBLEM — Z37.9 NORMAL LABOR: Status: ACTIVE | Noted: 2018-10-08

## 2018-10-08 LAB
ABO GROUP BLD: NORMAL
BLD GP AB SCN SERPL QL: NEGATIVE
DEPRECATED RDW RBC AUTO: 42.5 FL (ref 37–54)
ERYTHROCYTE [DISTWIDTH] IN BLOOD BY AUTOMATED COUNT: 13.7 % (ref 11.5–14.5)
HCT VFR BLD AUTO: 38.4 % (ref 37–47)
HGB BLD-MCNC: 13.5 G/DL (ref 12–16)
HIV1+2 AB SER QL: NORMAL
LYMPHOCYTES # BLD MANUAL: 3.23 10*3/MM3 (ref 1–3)
LYMPHOCYTES NFR BLD MANUAL: 25 % (ref 21–51)
LYMPHOCYTES NFR BLD MANUAL: 7 % (ref 0–10)
MCH RBC QN AUTO: 30.1 PG (ref 27–33)
MCHC RBC AUTO-ENTMCNC: 35.2 G/DL (ref 33–37)
MCV RBC AUTO: 85.7 FL (ref 80–94)
MONOCYTES # BLD AUTO: 0.9 10*3/MM3 (ref 0.1–0.9)
NEUTROPHILS # BLD AUTO: 8.78 10*3/MM3 (ref 1.4–6.5)
NEUTROPHILS NFR BLD MANUAL: 68 % (ref 30–70)
PLAT MORPH BLD: NORMAL
PLATELET # BLD AUTO: 258 10*3/MM3 (ref 130–400)
PMV BLD AUTO: 10.3 FL (ref 6–10)
RBC # BLD AUTO: 4.48 10*6/MM3 (ref 4.2–5.4)
RBC MORPH BLD: NORMAL
RH BLD: POSITIVE
RUBV IGG SER QL: NORMAL
RUBV IGG SER-ACNC: 107.3 IU/ML
SCAN SLIDE: NORMAL
T&S EXPIRATION DATE: NORMAL
WBC NRBC COR # BLD: 12.91 10*3/MM3 (ref 4.5–12.5)

## 2018-10-08 PROCEDURE — C1755 CATHETER, INTRASPINAL: HCPCS | Performed by: NURSE ANESTHETIST, CERTIFIED REGISTERED

## 2018-10-08 PROCEDURE — 25010000002 ONDANSETRON PER 1 MG: Performed by: NURSE ANESTHETIST, CERTIFIED REGISTERED

## 2018-10-08 PROCEDURE — C1755 CATHETER, INTRASPINAL: HCPCS

## 2018-10-08 PROCEDURE — 86850 RBC ANTIBODY SCREEN: CPT | Performed by: OBSTETRICS & GYNECOLOGY

## 2018-10-08 PROCEDURE — 85025 COMPLETE CBC W/AUTO DIFF WBC: CPT | Performed by: OBSTETRICS & GYNECOLOGY

## 2018-10-08 PROCEDURE — 86901 BLOOD TYPING SEROLOGIC RH(D): CPT | Performed by: OBSTETRICS & GYNECOLOGY

## 2018-10-08 PROCEDURE — 86900 BLOOD TYPING SEROLOGIC ABO: CPT | Performed by: OBSTETRICS & GYNECOLOGY

## 2018-10-08 PROCEDURE — 59025 FETAL NON-STRESS TEST: CPT

## 2018-10-08 PROCEDURE — 25010000002 FENTANYL CITRATE (PF) 100 MCG/2ML SOLUTION: Performed by: NURSE ANESTHETIST, CERTIFIED REGISTERED

## 2018-10-08 PROCEDURE — G0432 EIA HIV-1/HIV-2 SCREEN: HCPCS | Performed by: OBSTETRICS & GYNECOLOGY

## 2018-10-08 PROCEDURE — G0463 HOSPITAL OUTPT CLINIC VISIT: HCPCS

## 2018-10-08 PROCEDURE — 85007 BL SMEAR W/DIFF WBC COUNT: CPT | Performed by: OBSTETRICS & GYNECOLOGY

## 2018-10-08 PROCEDURE — 86762 RUBELLA ANTIBODY: CPT | Performed by: OBSTETRICS & GYNECOLOGY

## 2018-10-08 RX ORDER — ACETAMINOPHEN 325 MG/1
650 TABLET ORAL EVERY 4 HOURS PRN
Status: DISCONTINUED | OUTPATIENT
Start: 2018-10-08 | End: 2018-10-10 | Stop reason: HOSPADM

## 2018-10-08 RX ORDER — OXYTOCIN-SODIUM CHLORIDE 0.9% IV SOLN 30 UNIT/500ML 30-0.9/5 UT/ML-%
SOLUTION INTRAVENOUS
Status: COMPLETED
Start: 2018-10-08 | End: 2018-10-08

## 2018-10-08 RX ORDER — BUPRENORPHINE 2 MG/1
8 TABLET SUBLINGUAL 2 TIMES DAILY
Status: DISCONTINUED | OUTPATIENT
Start: 2018-10-08 | End: 2018-10-08

## 2018-10-08 RX ORDER — ONDANSETRON 2 MG/ML
4 INJECTION INTRAMUSCULAR; INTRAVENOUS ONCE AS NEEDED
Status: COMPLETED | OUTPATIENT
Start: 2018-10-08 | End: 2018-10-08

## 2018-10-08 RX ORDER — DOCUSATE SODIUM 100 MG/1
100 CAPSULE, LIQUID FILLED ORAL 2 TIMES DAILY
Status: DISCONTINUED | OUTPATIENT
Start: 2018-10-08 | End: 2018-10-10 | Stop reason: HOSPADM

## 2018-10-08 RX ORDER — OXYTOCIN-SODIUM CHLORIDE 0.9% IV SOLN 30 UNIT/500ML 30-0.9/5 UT/ML-%
250 SOLUTION INTRAVENOUS CONTINUOUS
Status: ACTIVE | OUTPATIENT
Start: 2018-10-08 | End: 2018-10-08

## 2018-10-08 RX ORDER — FENTANYL CITRATE 50 UG/ML
INJECTION, SOLUTION INTRAMUSCULAR; INTRAVENOUS
Status: COMPLETED
Start: 2018-10-08 | End: 2018-10-08

## 2018-10-08 RX ORDER — IBUPROFEN 800 MG/1
800 TABLET ORAL 3 TIMES DAILY
Status: DISCONTINUED | OUTPATIENT
Start: 2018-10-08 | End: 2018-10-09

## 2018-10-08 RX ORDER — ONDANSETRON 4 MG/1
4 TABLET, ORALLY DISINTEGRATING ORAL EVERY 6 HOURS PRN
Status: DISCONTINUED | OUTPATIENT
Start: 2018-10-08 | End: 2018-10-10 | Stop reason: HOSPADM

## 2018-10-08 RX ORDER — GUAIFENESIN 600 MG/1
1200 TABLET, EXTENDED RELEASE ORAL EVERY 12 HOURS SCHEDULED
Status: DISCONTINUED | OUTPATIENT
Start: 2018-10-08 | End: 2018-10-10 | Stop reason: HOSPADM

## 2018-10-08 RX ORDER — CALCIUM CARBONATE 200(500)MG
2 TABLET,CHEWABLE ORAL AS NEEDED
COMMUNITY
End: 2018-10-10 | Stop reason: HOSPADM

## 2018-10-08 RX ORDER — ONDANSETRON 4 MG/1
4 TABLET, FILM COATED ORAL EVERY 6 HOURS PRN
Status: DISCONTINUED | OUTPATIENT
Start: 2018-10-08 | End: 2018-10-10 | Stop reason: HOSPADM

## 2018-10-08 RX ORDER — FENTANYL CITRATE 50 UG/ML
100 INJECTION, SOLUTION INTRAMUSCULAR; INTRAVENOUS ONCE
Status: COMPLETED | OUTPATIENT
Start: 2018-10-08 | End: 2018-10-08

## 2018-10-08 RX ORDER — EPHEDRINE SULFATE 50 MG/ML
10 INJECTION, SOLUTION INTRAVENOUS
Status: DISCONTINUED | OUTPATIENT
Start: 2018-10-08 | End: 2018-10-08 | Stop reason: HOSPADM

## 2018-10-08 RX ORDER — ONDANSETRON 2 MG/ML
4 INJECTION INTRAMUSCULAR; INTRAVENOUS EVERY 6 HOURS PRN
Status: DISCONTINUED | OUTPATIENT
Start: 2018-10-08 | End: 2018-10-10 | Stop reason: HOSPADM

## 2018-10-08 RX ORDER — ZOLPIDEM TARTRATE 5 MG/1
5 TABLET ORAL NIGHTLY PRN
Status: DISCONTINUED | OUTPATIENT
Start: 2018-10-08 | End: 2018-10-10 | Stop reason: HOSPADM

## 2018-10-08 RX ORDER — LANOLIN 100 %
OINTMENT (GRAM) TOPICAL
Status: DISCONTINUED | OUTPATIENT
Start: 2018-10-08 | End: 2018-10-10 | Stop reason: HOSPADM

## 2018-10-08 RX ORDER — OXYTOCIN-SODIUM CHLORIDE 0.9% IV SOLN 30 UNIT/500ML 30-0.9/5 UT/ML-%
999 SOLUTION INTRAVENOUS ONCE
Status: COMPLETED | OUTPATIENT
Start: 2018-10-08 | End: 2018-10-08

## 2018-10-08 RX ORDER — BUPRENORPHINE 2 MG/1
8 TABLET SUBLINGUAL 2 TIMES DAILY
Status: DISCONTINUED | OUTPATIENT
Start: 2018-10-08 | End: 2018-10-10 | Stop reason: HOSPADM

## 2018-10-08 RX ORDER — LIDOCAINE HYDROCHLORIDE AND EPINEPHRINE 15; 5 MG/ML; UG/ML
INJECTION, SOLUTION EPIDURAL AS NEEDED
Status: DISCONTINUED | OUTPATIENT
Start: 2018-10-08 | End: 2018-10-08 | Stop reason: SURG

## 2018-10-08 RX ORDER — SODIUM CHLORIDE 0.9 % (FLUSH) 0.9 %
1-10 SYRINGE (ML) INJECTION AS NEEDED
Status: DISCONTINUED | OUTPATIENT
Start: 2018-10-08 | End: 2018-10-10 | Stop reason: HOSPADM

## 2018-10-08 RX ORDER — FAMOTIDINE 10 MG/ML
20 INJECTION, SOLUTION INTRAVENOUS ONCE AS NEEDED
Status: DISCONTINUED | OUTPATIENT
Start: 2018-10-08 | End: 2018-10-08 | Stop reason: HOSPADM

## 2018-10-08 RX ORDER — CALCIUM CARBONATE 200(500)MG
2 TABLET,CHEWABLE ORAL AS NEEDED
Status: DISCONTINUED | OUTPATIENT
Start: 2018-10-08 | End: 2018-10-08

## 2018-10-08 RX ORDER — LIDOCAINE HYDROCHLORIDE 10 MG/ML
INJECTION, SOLUTION EPIDURAL; INFILTRATION; INTRACAUDAL; PERINEURAL
Status: COMPLETED
Start: 2018-10-08 | End: 2018-10-08

## 2018-10-08 RX ORDER — LIDOCAINE HYDROCHLORIDE 10 MG/ML
INJECTION, SOLUTION EPIDURAL; INFILTRATION; INTRACAUDAL; PERINEURAL AS NEEDED
Status: DISCONTINUED | OUTPATIENT
Start: 2018-10-08 | End: 2018-10-08 | Stop reason: SURG

## 2018-10-08 RX ORDER — ACETAMINOPHEN 325 MG/1
650 TABLET ORAL EVERY 4 HOURS PRN
Status: DISCONTINUED | OUTPATIENT
Start: 2018-10-08 | End: 2018-10-08 | Stop reason: HOSPADM

## 2018-10-08 RX ORDER — METHYLERGONOVINE MALEATE 0.2 MG/ML
200 INJECTION INTRAVENOUS ONCE AS NEEDED
Status: DISCONTINUED | OUTPATIENT
Start: 2018-10-08 | End: 2018-10-08 | Stop reason: HOSPADM

## 2018-10-08 RX ORDER — CARBOPROST TROMETHAMINE 250 UG/ML
250 INJECTION, SOLUTION INTRAMUSCULAR AS NEEDED
Status: DISCONTINUED | OUTPATIENT
Start: 2018-10-08 | End: 2018-10-08 | Stop reason: HOSPADM

## 2018-10-08 RX ORDER — MISOPROSTOL 100 UG/1
800 TABLET ORAL AS NEEDED
Status: DISCONTINUED | OUTPATIENT
Start: 2018-10-08 | End: 2018-10-08 | Stop reason: HOSPADM

## 2018-10-08 RX ORDER — HYDROCODONE BITARTRATE AND ACETAMINOPHEN 5; 325 MG/1; MG/1
1 TABLET ORAL EVERY 4 HOURS PRN
Status: DISCONTINUED | OUTPATIENT
Start: 2018-10-08 | End: 2018-10-08

## 2018-10-08 RX ORDER — SODIUM CHLORIDE, SODIUM LACTATE, POTASSIUM CHLORIDE, CALCIUM CHLORIDE 600; 310; 30; 20 MG/100ML; MG/100ML; MG/100ML; MG/100ML
125 INJECTION, SOLUTION INTRAVENOUS CONTINUOUS
Status: DISCONTINUED | OUTPATIENT
Start: 2018-10-08 | End: 2018-10-08

## 2018-10-08 RX ORDER — IBUPROFEN 800 MG/1
800 TABLET ORAL EVERY 8 HOURS SCHEDULED
Status: DISCONTINUED | OUTPATIENT
Start: 2018-10-08 | End: 2018-10-08 | Stop reason: HOSPADM

## 2018-10-08 RX ORDER — OXYTOCIN-SODIUM CHLORIDE 0.9% IV SOLN 30 UNIT/500ML 30-0.9/5 UT/ML-%
125 SOLUTION INTRAVENOUS CONTINUOUS PRN
Status: DISCONTINUED | OUTPATIENT
Start: 2018-10-08 | End: 2018-10-08 | Stop reason: HOSPADM

## 2018-10-08 RX ORDER — FENTANYL CIT 0.2 MG/100ML-ROPIV 0.2%-NACL 0.9% EPIDURAL INJ 2/0.2 MCG/ML-%
14 SOLUTION INJECTION CONTINUOUS
Status: DISCONTINUED | OUTPATIENT
Start: 2018-10-08 | End: 2018-10-08

## 2018-10-08 RX ORDER — PRENATAL VIT/IRON FUM/FOLIC AC 27MG-0.8MG
1 TABLET ORAL DAILY
Status: DISCONTINUED | OUTPATIENT
Start: 2018-10-09 | End: 2018-10-08

## 2018-10-08 RX ADMIN — FENTANYL CITRATE 100 MCG: 50 INJECTION, SOLUTION INTRAMUSCULAR; INTRAVENOUS at 06:43

## 2018-10-08 RX ADMIN — DOCUSATE SODIUM 100 MG: 100 CAPSULE ORAL at 21:21

## 2018-10-08 RX ADMIN — BUPRENORPHINE HCL 8 MG: 2 TABLET SUBLINGUAL at 21:22

## 2018-10-08 RX ADMIN — IBUPROFEN 800 MG: 800 TABLET, FILM COATED ORAL at 15:16

## 2018-10-08 RX ADMIN — SODIUM CHLORIDE, POTASSIUM CHLORIDE, SODIUM LACTATE AND CALCIUM CHLORIDE 125 ML/HR: 600; 310; 30; 20 INJECTION, SOLUTION INTRAVENOUS at 07:42

## 2018-10-08 RX ADMIN — FENTANYL CIT 0.2 MG/100ML-ROPIV 0.2%-NACL 0.9% EPIDURAL INJ 14 ML/HR: 2/0.2 SOLUTION at 06:43

## 2018-10-08 RX ADMIN — ONDANSETRON 4 MG: 2 INJECTION INTRAMUSCULAR; INTRAVENOUS at 07:41

## 2018-10-08 RX ADMIN — OXYTOCIN 250 ML/HR: 10 INJECTION INTRAVENOUS at 09:36

## 2018-10-08 RX ADMIN — LIDOCAINE HYDROCHLORIDE 5 ML: 10 INJECTION, SOLUTION EPIDURAL; INFILTRATION; INTRACAUDAL; PERINEURAL at 06:43

## 2018-10-08 RX ADMIN — SODIUM CHLORIDE, POTASSIUM CHLORIDE, SODIUM LACTATE AND CALCIUM CHLORIDE 1000 ML: 600; 310; 30; 20 INJECTION, SOLUTION INTRAVENOUS at 06:24

## 2018-10-08 RX ADMIN — LIDOCAINE HYDROCHLORIDE AND EPINEPHRINE 3 ML: 15; 5 INJECTION, SOLUTION EPIDURAL at 06:40

## 2018-10-08 RX ADMIN — GUAIFENESIN 1200 MG: 600 TABLET, EXTENDED RELEASE ORAL at 21:21

## 2018-10-08 RX ADMIN — OXYTOCIN-SODIUM CHLORIDE 0.9% IV SOLN 30 UNIT/500ML 250 ML/HR: 30-0.9/5 SOLUTION at 09:36

## 2018-10-08 RX ADMIN — IBUPROFEN 800 MG: 800 TABLET, FILM COATED ORAL at 21:22

## 2018-10-08 RX ADMIN — SODIUM CHLORIDE, POTASSIUM CHLORIDE, SODIUM LACTATE AND CALCIUM CHLORIDE 125 ML/HR: 600; 310; 30; 20 INJECTION, SOLUTION INTRAVENOUS at 00:55

## 2018-10-08 NOTE — ANESTHESIA PREPROCEDURE EVALUATION
Anesthesia Evaluation     Patient summary reviewed and Nursing notes reviewed                Airway   Mallampati: II  TM distance: >3 FB  Neck ROM: full  No difficulty expected  Dental    (+) poor dentition    Pulmonary - negative pulmonary ROS and normal exam   Cardiovascular - negative cardio ROS and normal exam        Neuro/Psych- negative ROS  GI/Hepatic/Renal/Endo    (+)   hepatitis C, liver disease,     Musculoskeletal (-) negative ROS    Abdominal  - normal exam   Substance History - negative use     OB/GYN    (+) Pregnant,         Other - negative ROS                       Anesthesia Plan    ASA 2     epidural     Anesthetic plan, all risks, benefits, and alternatives have been provided, discussed and informed consent has been obtained with: patient.

## 2018-10-08 NOTE — ANESTHESIA PROCEDURE NOTES
Labor Epidural      Patient location during procedure: OB  Start Time: 10/8/2018 6:32 AM  Stop Time: 10/8/2018 6:43 AM  Indication:at surgeon's request  Performed By  CRNA: FOX OSBORNE  Preanesthetic Checklist  Completed: patient identified, site marked, surgical consent, pre-op evaluation, timeout performed, IV checked, risks and benefits discussed and monitors and equipment checked  Additional Notes  1st attempt at l3-l4 resulted in heme + in catheter, catheter removed, 2nd attempt, epidural placed without difficulty  Prep:  Pt Position:sitting  Sterile Tech:cap, gloves, mask and sterile barrier  Prep:povidone-iodine 7.5% surgical scrub  Monitoring:blood pressure monitoring and continuous pulse oximetry  Epidural Block Procedure:  Approach:midline  Guidance:landmark technique  Location:L2-L3  Needle Type:Tuohy  Needle Gauge:18 G  Loss of Resistance Medium: saline  Loss of Resistance: 7cm  Cath Depth at skin:13 cm  Paresthesia: none  Aspiration:negative  Test Dose:negative  Number of Attempts: 2  Post Assessment:  Dressing:secured with tape and occlusive dressing applied  Pt Tolerance:patient tolerated the procedure well with no apparent complications  Complications:no

## 2018-10-08 NOTE — PAYOR COMM NOTE
"[facility]   BAILEY MALDONADO  PHONE  [phone]  FAX  [phone]  NPI:  [id number]    DELIVERY NOTIFICATION  ICD:  O80  DR. DONOHUE   NPI:  [id number]    MarthaCarlos Manuel hood (23 y.o. Female)     Date of Birth Social Security Number Address Home Phone MRN    [id number]  [id number]  [place] [address] [phone] [id number]    Orthodoxy Marital Status          None Single       Admission Date Admission Type Admitting Provider Attending Provider Department, Room/Bed    10/7/18 Elective Chery Maxwell DO Gilbert, Travis Daniel, DO [facility], W239/    Discharge Date Discharge Disposition Discharge Destination                       Attending Provider:  Dong Donohue DO    Allergies:  No Known Allergies    Isolation:  None   Infection:  None   Code Status:  CPR    Ht:  167.6 cm (66\")   Wt:  90.5 kg (199 lb 8 oz)    Admission Cmt:  None   Principal Problem:  None                Active Insurance as of 10/7/2018     Primary Coverage     Payor Plan Insurance Group Employer/Plan Group    ANTHEM MEDICAID ANTHEM MEDICAID [id number]     Payor Plan Address Payor Plan Phone Number Effective From Effective To    [address] [address] [phone] 2018     [facility] [address]       Subscriber Name Subscriber Birth Date Member ID       CARLOS MANUEL BAILEY 1995 [id number]                 Emergency Contacts      (Rel.) Home Phone Work Phone Mobile Phone    MarthaGeovanna palacios (Relative) -- -- [phone]               History & Physical      Chery Maxwell DO at 10/8/2018  9:55 AM           Sebastian  Obstetric History and Physical    Chief Complaint   Patient presents with   • Contractions     Q 5 MIN X 1 HR       Subjective     Patient is a 23 y.o. female  currently at 39w0d, who presents with labor.    Her prenatal care is benign.  Her previous obstetric/gynecological history is noted for is " non-contributory.    The following portions of the patients history were reviewed and updated as appropriate: current medications, allergies, past medical history, past surgical history, past family history, past social history and problem list .   Social History     Social History   • Marital status: Single     Spouse name: N/A   • Number of children: N/A   • Years of education: N/A     Occupational History   • Not on file.     Social History Main Topics   • Smoking status: Current Every Day Smoker     Packs/day: 1.00     Years: 10.00     Start date: 4/2/2008   • Smokeless tobacco: Current User      Comment: pt is pregnant   • Alcohol use No   • Drug use: Yes     Types: Benzodiazepines, Other      Comment: suboxone   • Sexual activity: Yes     Birth control/ protection: None     Other Topics Concern   • Not on file     Social History Narrative   • No narrative on file     Past Medical History:   Diagnosis Date   • Hepatitis C    • Substance abuse (CMS/[facility])        Current Facility-Administered Medications:   •  buprenorphine (SUBUTEX) SL tablet 8 mg, 8 mg, Sublingual, BID, Chery Maxwell DO  •  calcium carbonate (TUMS) chewable tablet 500 mg (200 mg elemental), 2 tablet, Oral, PRN, Chery Maxwell DO  •  ePHEDrine injection 10 mg, 10 mg, Intravenous, Q5 Min PRN, Prieto Mohr CRNA  •  famotidine (PEPCID) injection 20 mg, 20 mg, Intravenous, Once PRN, Prieto Mohr CRNA  •  fentanyl 2mcg/mL-ROPIvacaine 0.2% in NS 100mL, 14 mL/hr, Epidural, Continuous, Prieto Mohr CRNA, Last Rate: 14 mL/hr at 10/08/18 0643, 14 mL/hr at 10/08/18 0643  •  lactated ringers infusion, 125 mL/hr, Intravenous, Continuous, Dong Walker DO, Last Rate: 125 mL/hr at 10/08/18 0742, 125 mL/hr at 10/08/18 0742  •  [START ON 10/9/2018] polyethylene glycol 3350 powder (packet), 17 g, Oral, Daily, Chery Maxwell DO  •  [START ON 10/9/2018] prenatal vitamin  27-0.8 tablet 1 tablet, 1 tablet, Oral, Daily, Chery Maxwell,     Facility-Administered Medications Ordered in Other Encounters:   •  lidocaine PF 1% (XYLOCAINE) injection, , , PRN, Prieto Mohr CRNA, 5 mL at 10/08/18 0643  •  lidocaine-EPINEPHrine (XYLOCAINE W/EPI) 1.5 %-1:153402 injection, , Epidural, PRN, Prieto Mohr CRNA, 3 mL at 10/08/18 0640  No Known Allergies  Past Surgical History:   Procedure Laterality Date   • EAR TUBES Bilateral 01/01/84   • EAR TUBES     • MOUTH SURGERY           Prenatal Information:   Maternal Prenatal Labs  Blood Type ABO Type   Date Value Ref Range Status   10/08/2018 O  Final      Rh Status RH type   Date Value Ref Range Status   10/08/2018 Positive  Final      Antibody Screen Antibody Screen   Date Value Ref Range Status   10/08/2018 Negative  Final      Rapid Urin Drug Screen Barbiturates Screen, Urine   Date Value Ref Range Status   10/07/2018 Negative Negative Final     Benzodiazepine Screen, Urine   Date Value Ref Range Status   10/07/2018 Negative Negative Final     Methadone Screen, Urine   Date Value Ref Range Status   10/07/2018 Negative Negative Final     Opiate Screen   Date Value Ref Range Status   10/07/2018 Negative Negative Final     THC, Screen, Urine   Date Value Ref Range Status   10/07/2018 Negative Negative Final     Cocaine Screen, Urine   Date Value Ref Range Status   10/07/2018 Negative Negative Final     Amphetamine Screen, Urine   Date Value Ref Range Status   10/07/2018 Negative Negative Final     Buprenorphine, Screen, Urine   Date Value Ref Range Status   10/07/2018 Positive (A) Negative Final     Oxycodone Screen, Urine   Date Value Ref Range Status   10/07/2018 Negative Negative Final      Group B Strep Culture No results found for: GBSANTIGEN           External Prenatal Results     Pregnancy Outside Results - Transcribed From Office Records - See Scanned Records For Details     Test Value Date Time     Hgb 13.5 g/dL 10/08/18 0145    Hct 38.4 % 10/08/18 0145    ABO O  10/08/18 0145    Rh Positive  10/08/18 0145    Antibody Screen Negative  10/08/18 0145    Glucose Fasting GTT       Glucose Tolerance Test 1 hour       Glucose Tolerance Test 3 hour       Gonorrhea (discrete)       Chlamydia (discrete)       RPR Non Reactive  18 1602    VDRL       Syphilis Antibody       Rubella       HBsAg Non-Reactive  18 1602    Herpes Simplex Virus PCR       Herpes Simplex VIrus Culture       HIV       Hep C RNA Quant PCR       Hep C Antibody Reactive  (A) 18 1602    AFP       Group B Strep Negative  18     GBS Susceptibility to Clindamycin       GBS Susceptibility to Erythromycin       Fetal Fibronectin       Genetic Testing, Maternal Blood             Drug Screening     Test Value Date Time    Urine Drug Screen       Amphetamine Screen Negative  10/07/18 2248    Barbiturate Screen Negative  10/07/18 224    Benzodiazepine Screen Negative  10/07/18 2248    Methadone Screen Negative  10/07/18 2248    Phencyclidine Screen Negative  10/07/18 2248    Opiates Screen Negative  10/07/18 2248    THC Screen Negative  10/07/18 2248    Cocaine Screen Negative  18 1120    Propoxyphene Screen       Buprenorphine Screen Positive  (A) 10/07/18 2248    Methamphetamine Screen Negative  18 1120    Oxycodone Screen Negative  10/07/18 2248    Tricyclic Antidepressants Screen                     Past OB History:     Obstetric History       T1      L3     SAB0   TAB0   Ectopic0   Molar0   Multiple0   Live Births3       # Outcome Date GA Lbr Wilmer/2nd Weight Sex Delivery Anes PTL Lv   4 Current            3 Para 17 40w0d  3402 g (7 lb 8 oz) F Vag-Spont None N BATSHEVA   2 Para 09/04/15 40w0d  3345 g (7 lb 6 oz) M Vag-Spont EPI N BATSHEVA   1 Term 11 40w0d  3232 g (7 lb 2 oz) F Vag-Spont EPI N BATSHEVA          Past Medical History: Past Medical History:   Diagnosis Date   • Hepatitis C    • Substance abuse  (CMS/[facility])       Past Surgical History Past Surgical History:   Procedure Laterality Date   • EAR TUBES Bilateral 01/01/84   • EAR TUBES     • MOUTH SURGERY        Family History: Family History   Problem Relation Age of Onset   • Bipolar disorder Mother    • Hypertension Father    • Kidney cancer Paternal Grandmother    • Diabetes Maternal Grandfather    • ADD / ADHD Neg Hx    • Suicide Attempts Neg Hx    • Self-Injurious Behavior  Neg Hx    • Alcohol abuse Neg Hx       Social History:  reports that she has been smoking.  She started smoking about 10 years ago. She has a 10.00 pack-year smoking history. She uses smokeless tobacco.   reports that she does not drink alcohol.   reports that she uses drugs, including Benzodiazepines and Other.        Review of Systems      Objective     Vital Signs Range for the last 24 hours  Temperature: Temp:  [96.3 °F (35.7 °C)-97.7 °F (36.5 °C)] 97.5 °F (36.4 °C)   Temp Source: Temp src: Oral   BP: BP: ()/(46-71) 102/63   Pulse: Heart Rate:  [] 78   Respirations: Resp:  [18-20] 20   Weight: Weight:  [90.5 kg (199 lb 8 oz)] 90.5 kg (199 lb 8 oz)     Physical Examination: General appearance - alert, well appearing, and in no distress, oriented to person, place, and time, normal appearing weight and well hydrated  Mental status - alert, oriented to person, place, and time, normal mood, behavior, speech, dress, motor activity, and thought processes, affect appropriate to mood  Neck - supple, no significant adenopathy  Chest - clear to auscultation, no wheezes, rales or rhonchi, symmetric air entry, no tachypnea, retractions or cyanosis  Heart - normal rate, regular rhythm, normal S1, S2, no murmurs, rubs, clicks or gallops  Abdomen - soft, nontender, gravid uterus, no masses or organomegaly  no rebound tenderness noted,   Pelvic - normal external genitalia, vulva, vagina, cervix, uterus and adnexa  Neurological - alert, oriented, normal speech, no focal findings or movement  disorder noted  Musculoskeletal - no joint tenderness, deformity or swelling  Extremities - peripheral pulses normal, no pedal edema, no clubbing or cyanosis  Skin - normal coloration and turgor, no rashes, no suspicious skin lesions noted        Cervix: Exam by:     Dilation:     Effacement:     Station:       Laboratory Results:   Lab Results (last 24 hours)     Procedure Component Value Units Date/Time    CBC & Differential [[id number]] Collected:  10/08/18 0145    Specimen:  Blood Updated:  10/08/18 0216    Narrative:       The following orders were created for panel order CBC & Differential.  Procedure                               Abnormality         Status                     ---------                               -----------         ------                     Manual Differential[[id number]]          Abnormal            Final result               Scan Slide[[id number]]                                       Final result               CBC Auto Differential[[id number]]        Abnormal            Final result                 Please view results for these tests on the individual orders.    CBC Auto Differential [[id number]]  (Abnormal) Collected:  10/08/18 0145    Specimen:  Blood Updated:  10/08/18 0216     WBC 12.91 (H) 10*3/mm3      RBC 4.48 10*6/mm3      Hemoglobin 13.5 g/dL      Hematocrit 38.4 %      MCV 85.7 fL      MCH 30.1 pg      MCHC 35.2 g/dL      RDW 13.7 %      RDW-SD 42.5 fl      MPV 10.3 (H) fL      Platelets 258 10*3/mm3     Scan Slide [[id number]] Collected:  10/08/18 0145    Specimen:  Blood Updated:  10/08/18 0216     Scan Slide --     Comment: See Manual Differential Results       Manual Differential [[id number]]  (Abnormal) Collected:  10/08/18 0145    Specimen:  Blood Updated:  10/08/18 0216     Neutrophil % 68.0 %      Lymphocyte % 25.0 %      Monocyte % 7.0 %      Neutrophils Absolute 8.78 (H) 10*3/mm3      Lymphocytes Absolute 3.23 (H) 10*3/mm3      Monocytes Absolute 0.90 10*3/mm3      RBC  Morphology Normal     Platelet Morphology Normal    Group B Streptococcus Culture - Swab, Vaginal/Rectum [[id number]] Resulted:  09/19/18     Specimen:  Swab from Vaginal/Rectum Updated:  10/08/18 0018     External Strep Group B Ag Negative    URINE DRUG SCREEN PLUS BUPRENORPHINE - [[id number]] Collected:  10/07/18 2248     Updated:  10/07/18 2334    Narrative:       The following orders were created for panel order URINE DRUG SCREEN PLUS BUPRENORPHINE -.  Procedure                               Abnormality         Status                     ---------                               -----------         ------                     Urine Drug Screen - Urin...[[id number]]  Abnormal            Final result               Buprenorphine Screen Uri...[[id number]]                                                   Please view results for these tests on the individual orders.    Urine Drug Screen - Urine, Clean Catch [[id number]]  (Abnormal) Collected:  10/07/18 2248    Specimen:  Urine from Urine, Clean Catch Updated:  10/07/18 2334     Amphetamine Screen, Urine Negative     Barbiturates Screen, Urine Negative     Benzodiazepine Screen, Urine Negative     Cocaine Screen, Urine Negative     Methadone Screen, Urine Negative     Opiate Screen Negative     Phencyclidine (PCP), Urine Negative     THC, Screen, Urine Negative     6-ACETYL MORPHINE Negative     Buprenorphine, Screen, Urine Positive (A)     Oxycodone Screen, Urine Negative    Narrative:       Negative Thresholds For Drugs Screened:                  Amphetamines              1000 ng/ml               Barbiturates               200 ng/ml               Benzodiazepines            200 ng/ml              Cocaine                    300 ng/ml              Methadone                  300 ng/ml              Opiates                    300 ng/ml               Phencyclidine               25 ng/ml               THC                         50 ng/ml              6-Acetyl Morphine           10  ng/ml              Buprenorphine                5 ng/ml              Oxycodone                  300 ng/ml    The reference range for all drugs tested is negative. This report includes final unconfirmed qualitative results to be used for medical treatment purposes only. Unconfirmed results must not be used for non-medical purposes such as employment or legal testing. Clinical consideration should be applied to any drug of abuse test, especially when unconfirmed quantitative results are used.          Radiology Review:   Imaging Results (last 72 hours)     ** No results found for the last 72 hours. **            Assessment/Plan     Active Problems:    Pregnancy    Normal labor      Assessment & Plan    Assessment:  1.  Intrauterine pregnancy at 39w0d weeks gestation with reassuring fetal status.    2.  labor  without ROM  3.  Obstetrical history significant for is non-contributory.  4.  GBS status: No results found for: GBSANTIGEN    Plan:  1. fetal and uterine monitoring  continuously and expectant management  2. Plan of care has been reviewed with patient   3.  Risks, benefits of treatment plan have been discussed.  4.  All questions have been answered.        Chery Maxwell DO  10/8/2018  9:56 AM      Electronically signed by Chery Maxwell DO at 10/8/2018  9:56 AM       Physician Progress Notes (last 24 hours) (Notes from 10/7/2018  2:16 PM through 10/8/2018  2:16 PM)     No notes of this type exist for this encounter.

## 2018-10-08 NOTE — PROGRESS NOTES
"Case Management/Social Work    Patient Name:  Chinmay Thomas  YOB: 1995  MRN: [id number]  Admit Date:  10/7/2018    SS received consult \"history of substance abuse.\" Pt is 24 Y/O Chinmay Thomas who delivered viable baby girl on 10/8/18. Pt states she has not given infant a name yet. FOB is Javed Tavares who is involved. Pt currently resides at the [facility] ([address] [place] [address]) and will return for 30 days at discharge. Pt states she voluntarily went to the [facility] in July. Pt utilizes WIC, SNAP benefits, and the HANDS program. Pt has three other children; Av Bashir, age 7, Mark Bashir, age 3 and Jimmie Tavares, age 1. Pt states paternal grandmother has custody of those children. Pt to sign infant up to receive Medicaid. Infant care supplies available including the car seat.     Pt's UDS is positive for Buprenorphine. Infant's UDS results are still pending. Pt has a history of Opiate and THC abuse. Pt had a positive UDS on 7/11/18 and 7/23/18 for THC and Buprenorphine. Pt is currently prescribed Buprenorphine (see JODY). Infant's meconium results are pending.     Pt states she has a history with [employer] in [place], [place]. SS contacted Central Intake and report was not accepted for investigation. Infant can be discharged back to the [facility] with mother. (intake ID# [id number]).     Pt will return to the [facility] at discharge.     [facility] staff to provide transportation at discharge.     SS to be contacted with any issues or concerns.     Electronically signed by:  Audrey Jack  10/08/18 3:40 PM  "

## 2018-10-08 NOTE — NON STRESS TEST
Chinmay Thomas, a  at 39w0d with an JANETTE of 10/15/2018, by Patient Reported, was seen at [facility] LABOR DELIVERY for a nonstress test.    Chief Complaint   Patient presents with   • Contractions     Q 5 MIN X 1 HR       Patient Active Problem List   Diagnosis   • Uncomplicated opioid dependence (CMS/HCC)   • Benzodiazepine dependence (CMS/HCC)   • 24 weeks gestation of pregnancy   • Abdominal pain during pregnancy in third trimester   • Pregnancy       Start Time:   Stop Time: 2300

## 2018-10-08 NOTE — PLAN OF CARE
Problem: Patient Care Overview  Goal: Plan of Care Review   10/08/18 0129   Coping/Psychosocial   Plan of Care Reviewed With patient;significant other   Plan of Care Review   Progress no change     Goal: Discharge Needs Assessment   10/08/18 0129   Discharge Needs Assessment   Readmission Within the Last 30 Days no previous admission in last 30 days   Concerns to be Addressed substance/tobacco abuse/use   Patient/Family Anticipates Transition to home;other (see comments)  ([facility])   Patient/Family Anticipated Services at Transition rehabilitation services   Transportation Concerns car, none   Transportation Anticipated family or friend will provide   Anticipated Changes Related to Illness none   Equipment Needed After Discharge none   Outpatient/Agency/Support Group Needs other (see comments)  ([facility])   Discharge Facility/Level of Care Needs substance abuse facility   Current Discharge Risk substance use/abuse   Disability   Equipment Currently Used at Home none       Problem: Labor (Cervical Ripen, Induct, Augment) (Adult,Obstetrics,Pediatric)  Goal: Signs and Symptoms of Listed Potential Problems Will be Absent, Minimized or Managed (Labor)   10/08/18 0129   Goal/Outcome Evaluation   Problems Assessed (Labor) all   Problems Present (Labor) none

## 2018-10-08 NOTE — H&P
ROSA Cortes  Obstetric History and Physical    Chief Complaint   Patient presents with   • Contractions     Q 5 MIN X 1 HR       Subjective     Patient is a 23 y.o. female  currently at 39w0d, who presents with labor.    Her prenatal care is benign.  Her previous obstetric/gynecological history is noted for is non-contributory.    The following portions of the patients history were reviewed and updated as appropriate: current medications, allergies, past medical history, past surgical history, past family history, past social history and problem list .   Social History     Social History   • Marital status: Single     Spouse name: N/A   • Number of children: N/A   • Years of education: N/A     Occupational History   • Not on file.     Social History Main Topics   • Smoking status: Current Every Day Smoker     Packs/day: 1.00     Years: 10.00     Start date: 2008   • Smokeless tobacco: Current User      Comment: pt is pregnant   • Alcohol use No   • Drug use: Yes     Types: Benzodiazepines, Other      Comment: suboxone   • Sexual activity: Yes     Birth control/ protection: None     Other Topics Concern   • Not on file     Social History Narrative   • No narrative on file     Past Medical History:   Diagnosis Date   • Hepatitis C    • Substance abuse (CMS/[facility])        Current Facility-Administered Medications:   •  buprenorphine (SUBUTEX) SL tablet 8 mg, 8 mg, Sublingual, BID, Chery Maxwell, DO  •  calcium carbonate (TUMS) chewable tablet 500 mg (200 mg elemental), 2 tablet, Oral, PRN, Chery Maxwell, DO  •  ePHEDrine injection 10 mg, 10 mg, Intravenous, Q5 Min PRN, Prieto Mohr CRNA  •  famotidine (PEPCID) injection 20 mg, 20 mg, Intravenous, Once PRN, Prieto Mohr CRNA  •  fentanyl 2mcg/mL-ROPIvacaine 0.2% in NS 100mL, 14 mL/hr, Epidural, Continuous, Prieto Mohr CRNA, Last Rate: 14 mL/hr at 10/08/18 0643, 14 mL/hr at 10/08/18 0643  •   lactated ringers infusion, 125 mL/hr, Intravenous, Continuous, Dong Walker DO, Last Rate: 125 mL/hr at 10/08/18 0742, 125 mL/hr at 10/08/18 0742  •  [START ON 10/9/2018] polyethylene glycol 3350 powder (packet), 17 g, Oral, Daily, Chery Maxwell DO  •  [START ON 10/9/2018] prenatal vitamin 27-0.8 tablet 1 tablet, 1 tablet, Oral, Daily, Chery Maxwell DO    Facility-Administered Medications Ordered in Other Encounters:   •  lidocaine PF 1% (XYLOCAINE) injection, , , PRN, Prieto Mohr CRNA, 5 mL at 10/08/18 0643  •  lidocaine-EPINEPHrine (XYLOCAINE W/EPI) 1.5 %-1:509086 injection, , Epidural, PRN, Prieto Mohr CRNA, 3 mL at 10/08/18 0640  No Known Allergies  Past Surgical History:   Procedure Laterality Date   • EAR TUBES Bilateral 01/01/84   • EAR TUBES     • MOUTH SURGERY           Prenatal Information:   Maternal Prenatal Labs  Blood Type ABO Type   Date Value Ref Range Status   10/08/2018 O  Final      Rh Status RH type   Date Value Ref Range Status   10/08/2018 Positive  Final      Antibody Screen Antibody Screen   Date Value Ref Range Status   10/08/2018 Negative  Final      Rapid Urin Drug Screen Barbiturates Screen, Urine   Date Value Ref Range Status   10/07/2018 Negative Negative Final     Benzodiazepine Screen, Urine   Date Value Ref Range Status   10/07/2018 Negative Negative Final     Methadone Screen, Urine   Date Value Ref Range Status   10/07/2018 Negative Negative Final     Opiate Screen   Date Value Ref Range Status   10/07/2018 Negative Negative Final     THC, Screen, Urine   Date Value Ref Range Status   10/07/2018 Negative Negative Final     Cocaine Screen, Urine   Date Value Ref Range Status   10/07/2018 Negative Negative Final     Amphetamine Screen, Urine   Date Value Ref Range Status   10/07/2018 Negative Negative Final     Buprenorphine, Screen, Urine   Date Value Ref Range Status   10/07/2018 Positive (A) Negative Final      Oxycodone Screen, Urine   Date Value Ref Range Status   10/07/2018 Negative Negative Final      Group B Strep Culture No results found for: GBSANTIGEN           External Prenatal Results     Pregnancy Outside Results - Transcribed From Office Records - See Scanned Records For Details     Test Value Date Time    Hgb 13.5 g/dL 10/08/18 0145    Hct 38.4 % 10/08/18 0145    ABO O  10/08/18 0145    Rh Positive  10/08/18 0145    Antibody Screen Negative  10/08/18 0145    Glucose Fasting GTT       Glucose Tolerance Test 1 hour       Glucose Tolerance Test 3 hour       Gonorrhea (discrete)       Chlamydia (discrete)       RPR Non Reactive  18 1602    VDRL       Syphilis Antibody       Rubella       HBsAg Non-Reactive  18 1602    Herpes Simplex Virus PCR       Herpes Simplex VIrus Culture       HIV       Hep C RNA Quant PCR       Hep C Antibody Reactive  (A) 18 1602    AFP       Group B Strep Negative  18     GBS Susceptibility to Clindamycin       GBS Susceptibility to Erythromycin       Fetal Fibronectin       Genetic Testing, Maternal Blood             Drug Screening     Test Value Date Time    Urine Drug Screen       Amphetamine Screen Negative  10/07/18 2248    Barbiturate Screen Negative  10/07/18 2248    Benzodiazepine Screen Negative  10/07/18 2248    Methadone Screen Negative  10/07/18 2248    Phencyclidine Screen Negative  10/07/18 2248    Opiates Screen Negative  10/07/18 2248    THC Screen Negative  10/07/18 2248    Cocaine Screen Negative  18 1120    Propoxyphene Screen       Buprenorphine Screen Positive  (A) 10/07/18 2248    Methamphetamine Screen Negative  18 1120    Oxycodone Screen Negative  10/07/18 2248    Tricyclic Antidepressants Screen                     Past OB History:     Obstetric History       T1      L3     SAB0   TAB0   Ectopic0   Molar0   Multiple0   Live Births3       # Outcome Date GA Lbr Wilmer/2nd Weight Sex Delivery Anes PTL Lv   4 Current             3 Para 06/03/17 40w0d  3402 g (7 lb 8 oz) F Vag-Spont None N BATSHEVA   2 Para 09/04/15 40w0d  3345 g (7 lb 6 oz) M Vag-Spont EPI N BATSHEVA   1 Term 05/02/11 40w0d  3232 g (7 lb 2 oz) F Vag-Spont EPI N BATSHEVA          Past Medical History: Past Medical History:   Diagnosis Date   • Hepatitis C    • Substance abuse (CMS/HCC)       Past Surgical History Past Surgical History:   Procedure Laterality Date   • EAR TUBES Bilateral 01/01/84   • EAR TUBES     • MOUTH SURGERY        Family History: Family History   Problem Relation Age of Onset   • Bipolar disorder Mother    • Hypertension Father    • Kidney cancer Paternal Grandmother    • Diabetes Maternal Grandfather    • ADD / ADHD Neg Hx    • Suicide Attempts Neg Hx    • Self-Injurious Behavior  Neg Hx    • Alcohol abuse Neg Hx       Social History:  reports that she has been smoking.  She started smoking about 10 years ago. She has a 10.00 pack-year smoking history. She uses smokeless tobacco.   reports that she does not drink alcohol.   reports that she uses drugs, including Benzodiazepines and Other.        Review of Systems      Objective     Vital Signs Range for the last 24 hours  Temperature: Temp:  [96.3 °F (35.7 °C)-97.7 °F (36.5 °C)] 97.5 °F (36.4 °C)   Temp Source: Temp src: Oral   BP: BP: ()/(46-71) 102/63   Pulse: Heart Rate:  [] 78   Respirations: Resp:  [18-20] 20   Weight: Weight:  [90.5 kg (199 lb 8 oz)] 90.5 kg (199 lb 8 oz)     Physical Examination: General appearance - alert, well appearing, and in no distress, oriented to person, place, and time, normal appearing weight and well hydrated  Mental status - alert, oriented to person, place, and time, normal mood, behavior, speech, dress, motor activity, and thought processes, affect appropriate to mood  Neck - supple, no significant adenopathy  Chest - clear to auscultation, no wheezes, rales or rhonchi, symmetric air entry, no tachypnea, retractions or cyanosis  Heart - normal rate, regular  rhythm, normal S1, S2, no murmurs, rubs, clicks or gallops  Abdomen - soft, nontender, gravid uterus, no masses or organomegaly  no rebound tenderness noted,   Pelvic - normal external genitalia, vulva, vagina, cervix, uterus and adnexa  Neurological - alert, oriented, normal speech, no focal findings or movement disorder noted  Musculoskeletal - no joint tenderness, deformity or swelling  Extremities - peripheral pulses normal, no pedal edema, no clubbing or cyanosis  Skin - normal coloration and turgor, no rashes, no suspicious skin lesions noted        Cervix: Exam by:     Dilation:     Effacement:     Station:       Laboratory Results:   Lab Results (last 24 hours)     Procedure Component Value Units Date/Time    CBC & Differential [[id number]] Collected:  10/08/18 0145    Specimen:  Blood Updated:  10/08/18 0216    Narrative:       The following orders were created for panel order CBC & Differential.  Procedure                               Abnormality         Status                     ---------                               -----------         ------                     Manual Differential[[id number]]          Abnormal            Final result               Scan Slide[[id number]]                                       Final result               CBC Auto Differential[[id number]]        Abnormal            Final result                 Please view results for these tests on the individual orders.    CBC Auto Differential [[id number]]  (Abnormal) Collected:  10/08/18 0145    Specimen:  Blood Updated:  10/08/18 0216     WBC 12.91 (H) 10*3/mm3      RBC 4.48 10*6/mm3      Hemoglobin 13.5 g/dL      Hematocrit 38.4 %      MCV 85.7 fL      MCH 30.1 pg      MCHC 35.2 g/dL      RDW 13.7 %      RDW-SD 42.5 fl      MPV 10.3 (H) fL      Platelets 258 10*3/mm3     Scan Slide [[id number]] Collected:  10/08/18 0145    Specimen:  Blood Updated:  10/08/18 0216     Scan Slide --     Comment: See Manual Differential Results       Manual  Differential [[id number]]  (Abnormal) Collected:  10/08/18 0145    Specimen:  Blood Updated:  10/08/18 0216     Neutrophil % 68.0 %      Lymphocyte % 25.0 %      Monocyte % 7.0 %      Neutrophils Absolute 8.78 (H) 10*3/mm3      Lymphocytes Absolute 3.23 (H) 10*3/mm3      Monocytes Absolute 0.90 10*3/mm3      RBC Morphology Normal     Platelet Morphology Normal    Group B Streptococcus Culture - Swab, Vaginal/Rectum [[id number]] Resulted:  09/19/18     Specimen:  Swab from Vaginal/Rectum Updated:  10/08/18 0018     External Strep Group B Ag Negative    URINE DRUG SCREEN PLUS BUPRENORPHINE - [[id number]] Collected:  10/07/18 2248     Updated:  10/07/18 2334    Narrative:       The following orders were created for panel order URINE DRUG SCREEN PLUS BUPRENORPHINE -.  Procedure                               Abnormality         Status                     ---------                               -----------         ------                     Urine Drug Screen - Urin...[[id number]]  Abnormal            Final result               Buprenorphine Screen Uri...[[id number]]                                                   Please view results for these tests on the individual orders.    Urine Drug Screen - Urine, Clean Catch [[id number]]  (Abnormal) Collected:  10/07/18 2248    Specimen:  Urine from Urine, Clean Catch Updated:  10/07/18 2334     Amphetamine Screen, Urine Negative     Barbiturates Screen, Urine Negative     Benzodiazepine Screen, Urine Negative     Cocaine Screen, Urine Negative     Methadone Screen, Urine Negative     Opiate Screen Negative     Phencyclidine (PCP), Urine Negative     THC, Screen, Urine Negative     6-ACETYL MORPHINE Negative     Buprenorphine, Screen, Urine Positive (A)     Oxycodone Screen, Urine Negative    Narrative:       Negative Thresholds For Drugs Screened:                  Amphetamines              1000 ng/ml               Barbiturates               200 ng/ml               Benzodiazepines             200 ng/ml              Cocaine                    300 ng/ml              Methadone                  300 ng/ml              Opiates                    300 ng/ml               Phencyclidine               25 ng/ml               THC                         50 ng/ml              6-Acetyl Morphine           10 ng/ml              Buprenorphine                5 ng/ml              Oxycodone                  300 ng/ml    The reference range for all drugs tested is negative. This report includes final unconfirmed qualitative results to be used for medical treatment purposes only. Unconfirmed results must not be used for non-medical purposes such as employment or legal testing. Clinical consideration should be applied to any drug of abuse test, especially when unconfirmed quantitative results are used.          Radiology Review:   Imaging Results (last 72 hours)     ** No results found for the last 72 hours. **            Assessment/Plan     Active Problems:    Pregnancy    Normal labor      Assessment & Plan    Assessment:  1.  Intrauterine pregnancy at 39w0d weeks gestation with reassuring fetal status.    2.  labor  without ROM  3.  Obstetrical history significant for is non-contributory.  4.  GBS status: No results found for: GBSANTIGEN    Plan:  1. fetal and uterine monitoring  continuously and expectant management  2. Plan of care has been reviewed with patient   3.  Risks, benefits of treatment plan have been discussed.  4.  All questions have been answered.        Chery Maxwell,   10/8/2018  9:56 AM

## 2018-10-08 NOTE — L&D DELIVERY NOTE
Vaginal Delivery Procedure Note    Chinmay Thomas  Gestational Age-39.0 weeks        OBGYN: Chery Maxwell DO      Pre-op Diagnosis:   24 y/o  @ 39.0 weeks in active labor      Anesthesia: Epidural        Detailed Description of Procedure     The patient was prepped and draped in normal sterile fashion. The head was delivered without difficulty. There was a nuchal cord x 1. Anterior and posterior shoulders delivered without any problems. The rest of the infant was delivered in controlled fashion.The infant was bulb suctioned at delivery. The placenta delivered intact. The patient tolerated the procedure well and went to the recovery room in stable condition.        Time of delivery: 932  Maternal Blood Type: O Positive  Fetal Gender: Female  Nuchal Cord: x 1  Tears: None   Blood Cord: Yes  Estimated Blood Loss: 100cc  Placenta: Spontaneous, Delivered Intact   APGARS:  9    9            Disposition: Transfer to Women's Health Floor  Condition: Stable    Chery Maxwell DO     Date: 10/8/2018  Time: 9:56 AM

## 2018-10-09 LAB
BASOPHILS # BLD AUTO: 0.01 10*3/MM3 (ref 0–0.3)
BASOPHILS NFR BLD AUTO: 0.1 % (ref 0–2)
DEPRECATED RDW RBC AUTO: 44.5 FL (ref 37–54)
EOSINOPHIL # BLD AUTO: 0.12 10*3/MM3 (ref 0–0.7)
EOSINOPHIL NFR BLD AUTO: 1 % (ref 0–5)
ERYTHROCYTE [DISTWIDTH] IN BLOOD BY AUTOMATED COUNT: 13.6 % (ref 11.5–14.5)
HCT VFR BLD AUTO: 38.5 % (ref 37–47)
HGB BLD-MCNC: 12.9 G/DL (ref 12–16)
IMM GRANULOCYTES # BLD: 0.08 10*3/MM3 (ref 0–0.03)
IMM GRANULOCYTES NFR BLD: 0.7 % (ref 0–0.5)
LYMPHOCYTES # BLD AUTO: 3.82 10*3/MM3 (ref 1–3)
LYMPHOCYTES NFR BLD AUTO: 31.6 % (ref 21–51)
MCH RBC QN AUTO: 29.8 PG (ref 27–33)
MCHC RBC AUTO-ENTMCNC: 33.5 G/DL (ref 33–37)
MCV RBC AUTO: 88.9 FL (ref 80–94)
MONOCYTES # BLD AUTO: 1.43 10*3/MM3 (ref 0.1–0.9)
MONOCYTES NFR BLD AUTO: 11.8 % (ref 0–10)
NEUTROPHILS # BLD AUTO: 6.62 10*3/MM3 (ref 1.4–6.5)
NEUTROPHILS NFR BLD AUTO: 54.8 % (ref 30–70)
PLATELET # BLD AUTO: 215 10*3/MM3 (ref 130–400)
PMV BLD AUTO: 10.5 FL (ref 6–10)
RBC # BLD AUTO: 4.33 10*6/MM3 (ref 4.2–5.4)
WBC NRBC COR # BLD: 12.08 10*3/MM3 (ref 4.5–12.5)

## 2018-10-09 PROCEDURE — 85025 COMPLETE CBC W/AUTO DIFF WBC: CPT | Performed by: OBSTETRICS & GYNECOLOGY

## 2018-10-09 RX ORDER — IBUPROFEN 800 MG/1
800 TABLET ORAL EVERY 8 HOURS SCHEDULED
Status: DISCONTINUED | OUTPATIENT
Start: 2018-10-09 | End: 2018-10-10 | Stop reason: HOSPADM

## 2018-10-09 RX ADMIN — GUAIFENESIN 1200 MG: 600 TABLET, EXTENDED RELEASE ORAL at 08:48

## 2018-10-09 RX ADMIN — BUPRENORPHINE HCL 8 MG: 2 TABLET SUBLINGUAL at 08:48

## 2018-10-09 RX ADMIN — BENZOCAINE AND LEVOMENTHOL: 200; 5 SPRAY TOPICAL at 08:48

## 2018-10-09 RX ADMIN — DOCUSATE SODIUM 100 MG: 100 CAPSULE ORAL at 21:23

## 2018-10-09 RX ADMIN — IBUPROFEN 800 MG: 800 TABLET, FILM COATED ORAL at 06:36

## 2018-10-09 RX ADMIN — DOCUSATE SODIUM 100 MG: 100 CAPSULE ORAL at 08:48

## 2018-10-09 RX ADMIN — IBUPROFEN 800 MG: 800 TABLET, FILM COATED ORAL at 13:24

## 2018-10-09 RX ADMIN — GUAIFENESIN 1200 MG: 600 TABLET, EXTENDED RELEASE ORAL at 21:23

## 2018-10-09 RX ADMIN — BUPRENORPHINE HCL 8 MG: 2 TABLET SUBLINGUAL at 21:23

## 2018-10-09 RX ADMIN — IBUPROFEN 800 MG: 800 TABLET, FILM COATED ORAL at 21:23

## 2018-10-09 NOTE — PLAN OF CARE
Problem: Patient Care Overview  Goal: Plan of Care Review  Outcome: Ongoing (interventions implemented as appropriate)   10/08/18 0129 10/08/18 2000   Coping/Psychosocial   Plan of Care Reviewed With --  patient   Plan of Care Review   Progress no change --      Goal: Individualization and Mutuality  Outcome: Ongoing (interventions implemented as appropriate)      Problem: Postpartum (Vaginal Delivery) (Adult,Obstetrics,Pediatric)  Goal: Signs and Symptoms of Listed Potential Problems Will be Absent, Minimized or Managed (Postpartum)  Outcome: Ongoing (interventions implemented as appropriate)   10/08/18 2027   Goal/Outcome Evaluation   Problems Assessed (Postpartum Vaginal Delivery) all   Problems Present (Postpartum Vag Deliv) none

## 2018-10-09 NOTE — PROGRESS NOTES
" Sebastian  Vaginal Delivery Progress Note    Subjective   Subjective  Postpartum Day 1: Vaginal Delivery    The patient feels well.  Her pain is well controlled with nonsteroidal anti-inflammatory drugs.   She is ambulating well.  Patient describes her bleeding as moderate lochia.    Breastfeeding: declines.    Objective     Objective   Vital Signs Range for the last 24 hours  Temperature: Temp:  [97.5 °F (36.4 °C)-97.8 °F (36.6 °C)] 97.8 °F (36.6 °C)   Temp Source: Temp src: Oral   BP: BP: ()/(51-77) 129/77   Pulse: Heart Rate:  [] 80   Respirations: Resp:  [18-20] 18   Weight:       Admit Height:  Height: 167.6 cm (66\")    Physical Exam:  General:  no acute distresss.  Abdomen: Fundus: appropriate, firm, non tender  Extremities: normal, atraumatic, no cyanosis, and trace edema.       [unfilled]       Lab Results   Component Value Date    ABO O 10/08/2018    RH Positive 10/08/2018        Lab Results   Component Value Date    HGB 12.9 10/09/2018    HCT 38.5 10/09/2018         Assessment/Plan   Assessment & Plan    Pregnancy    Normal labor      Chinmay Thoams is Day 1  post-partum  Vaginal, Spontaneous Delivery    .      Plan:  Continue current care.      Bernadette Hernandez, KOKI  10/9/2018  9:21 AM    "

## 2018-10-09 NOTE — PAYOR COMM NOTE
"CONTACT:  NANI LEO RN, BSN  UTILIZATION MANAGEMENT DEPT.  [address], [address]  PHONE:  [phone]  FAX: [phone]    REQUEST FOR INPATIENT DELIVERY AUTHORIZATION.    ICD 10 CODE: O80  DR. AGNES DONOHUE NPI: [id number]  [facility] NPI: [id number]    Martha Carlos Manuel Harrell (23 y.o. Female)     Date of Birth Social Security Number Address Home Phone MRN    1995  [facility] [facility] [address] [phone] [id number]    Rastafari Marital Status          None Single       Admission Date Admission Type Admitting Provider Attending Provider Department, Room/Bed    10/8/18 Elective Chery Maxwell, Agnes Montano DO [facility], W239/1    Discharge Date Discharge Disposition Discharge Destination                       Attending Provider:  Agnes Donohue DO    Allergies:  No Known Allergies    Isolation:  None   Infection:  None   Code Status:  CPR    Ht:  167.6 cm (66\")   Wt:  90.5 kg (199 lb 8 oz)    Admission Cmt:  None   Principal Problem:  None                Active Insurance as of 10/7/2018     Primary Coverage     Payor Plan Insurance Group Employer/Plan Group    ANTH MEDICAID ANTH MEDICAID [id number]     Payor Plan Address Payor Plan Phone Number Effective From Effective To    [address] [address] [phone] 2018     [facility] [address]       Subscriber Name Subscriber Birth Date Member ID       MARTHACARLOS MANUEL REYNA 1995 [id number]                 Emergency Contacts      (Rel.) Home Phone Work Phone Mobile Phone    MarthaGeovanna (Relative) -- -- [phone]        DELIVERY INFORMATION:    ADMIT DATE: 10/8/2018    DELIVERY DATE AND TIME: 10/8/2018 @ 0932    DELIVERY TYPE: VAGINAL    GENDER OF BABY: FEMALE    WEIGHT: 3334 GRAMS    APGARS: 9/9    NURSERY TYPE: REGULAR     GESTATIONAL AGE: 39/0    EDC: 10/15/2018    /PARA: 4/4     "   History & Physical      Chery Maxwell DO at 10/8/2018  9:55 AM           Sebastian  Obstetric History and Physical    Chief Complaint   Patient presents with   • Contractions     Q 5 MIN X 1 HR       Subjective     Patient is a 23 y.o. female  currently at 39w0d, who presents with labor.    Her prenatal care is benign.  Her previous obstetric/gynecological history is noted for is non-contributory.    The following portions of the patients history were reviewed and updated as appropriate: current medications, allergies, past medical history, past surgical history, past family history, past social history and problem list .   Social History     Social History   • Marital status: Single     Spouse name: N/A   • Number of children: N/A   • Years of education: N/A     Occupational History   • Not on file.     Social History Main Topics   • Smoking status: Current Every Day Smoker     Packs/day: 1.00     Years: 10.00     Start date: 2008   • Smokeless tobacco: Current User      Comment: pt is pregnant   • Alcohol use No   • Drug use: Yes     Types: Benzodiazepines, Other      Comment: suboxone   • Sexual activity: Yes     Birth control/ protection: None     Other Topics Concern   • Not on file     Social History Narrative   • No narrative on file     Past Medical History:   Diagnosis Date   • Hepatitis C    • Substance abuse (CMS/[facility])        Current Facility-Administered Medications:   •  buprenorphine (SUBUTEX) SL tablet 8 mg, 8 mg, Sublingual, BID, Chery Maxwell DO  •  calcium carbonate (TUMS) chewable tablet 500 mg (200 mg elemental), 2 tablet, Oral, PRN, Chery Maxwell,   •  ePHEDrine injection 10 mg, 10 mg, Intravenous, Q5 Min PRN, Prieto Mohr CRNA  •  famotidine (PEPCID) injection 20 mg, 20 mg, Intravenous, Once PRN, Prieto Mohr CRNA  •  fentanyl 2mcg/mL-ROPIvacaine 0.2% in NS 100mL, 14 mL/hr, Epidural, Continuous, Prieto Mohr  SHERI Swann, Last Rate: 14 mL/hr at 10/08/18 0643, 14 mL/hr at 10/08/18 0643  •  lactated ringers infusion, 125 mL/hr, Intravenous, Continuous, Dong Walker DO, Last Rate: 125 mL/hr at 10/08/18 0742, 125 mL/hr at 10/08/18 0742  •  [START ON 10/9/2018] polyethylene glycol 3350 powder (packet), 17 g, Oral, Daily, Chery Maxwell DO  •  [START ON 10/9/2018] prenatal vitamin 27-0.8 tablet 1 tablet, 1 tablet, Oral, Daily, Chery Maxwell DO    Facility-Administered Medications Ordered in Other Encounters:   •  lidocaine PF 1% (XYLOCAINE) injection, , , PRN, Prieto Mohr CRNA, 5 mL at 10/08/18 0643  •  lidocaine-EPINEPHrine (XYLOCAINE W/EPI) 1.5 %-1:020718 injection, , Epidural, PRN, Prieto Mohr CRNA, 3 mL at 10/08/18 0640  No Known Allergies  Past Surgical History:   Procedure Laterality Date   • EAR TUBES Bilateral 01/01/84   • EAR TUBES     • MOUTH SURGERY           Prenatal Information:   Maternal Prenatal Labs  Blood Type ABO Type   Date Value Ref Range Status   10/08/2018 O  Final      Rh Status RH type   Date Value Ref Range Status   10/08/2018 Positive  Final      Antibody Screen Antibody Screen   Date Value Ref Range Status   10/08/2018 Negative  Final      Rapid Urin Drug Screen Barbiturates Screen, Urine   Date Value Ref Range Status   10/07/2018 Negative Negative Final     Benzodiazepine Screen, Urine   Date Value Ref Range Status   10/07/2018 Negative Negative Final     Methadone Screen, Urine   Date Value Ref Range Status   10/07/2018 Negative Negative Final     Opiate Screen   Date Value Ref Range Status   10/07/2018 Negative Negative Final     THC, Screen, Urine   Date Value Ref Range Status   10/07/2018 Negative Negative Final     Cocaine Screen, Urine   Date Value Ref Range Status   10/07/2018 Negative Negative Final     Amphetamine Screen, Urine   Date Value Ref Range Status   10/07/2018 Negative Negative Final     Buprenorphine,  Screen, Urine   Date Value Ref Range Status   10/07/2018 Positive (A) Negative Final     Oxycodone Screen, Urine   Date Value Ref Range Status   10/07/2018 Negative Negative Final      Group B Strep Culture No results found for: GBSANTIGEN           External Prenatal Results     Pregnancy Outside Results - Transcribed From Office Records - See Scanned Records For Details     Test Value Date Time    Hgb 13.5 g/dL 10/08/18 0145    Hct 38.4 % 10/08/18 0145    ABO O  10/08/18 0145    Rh Positive  10/08/18 0145    Antibody Screen Negative  10/08/18 0145    Glucose Fasting GTT       Glucose Tolerance Test 1 hour       Glucose Tolerance Test 3 hour       Gonorrhea (discrete)       Chlamydia (discrete)       RPR Non Reactive  18 1602    VDRL       Syphilis Antibody       Rubella       HBsAg Non-Reactive  18 1602    Herpes Simplex Virus PCR       Herpes Simplex VIrus Culture       HIV       Hep C RNA Quant PCR       Hep C Antibody Reactive  (A) 18 1602    AFP       Group B Strep Negative  18     GBS Susceptibility to Clindamycin       GBS Susceptibility to Erythromycin       Fetal Fibronectin       Genetic Testing, Maternal Blood             Drug Screening     Test Value Date Time    Urine Drug Screen       Amphetamine Screen Negative  10/07/18 2248    Barbiturate Screen Negative  10/07/18 2248    Benzodiazepine Screen Negative  10/07/18 2248    Methadone Screen Negative  10/07/18 2248    Phencyclidine Screen Negative  10/07/18 2248    Opiates Screen Negative  10/07/18 2248    THC Screen Negative  10/07/18 2248    Cocaine Screen Negative  18 1120    Propoxyphene Screen       Buprenorphine Screen Positive  (A) 10/07/18 2248    Methamphetamine Screen Negative  18 1120    Oxycodone Screen Negative  10/07/18 2248    Tricyclic Antidepressants Screen                     Past OB History:     Obstetric History       T1      L3     SAB0   TAB0   Ectopic0   Molar0   Multiple0   Live  Births3       # Outcome Date GA Lbr Wilmer/2nd Weight Sex Delivery Anes PTL Lv   4 Current            3 Para 06/03/17 40w0d  3402 g (7 lb 8 oz) F Vag-Spont None N BATSHEVA   2 Para 09/04/15 40w0d  3345 g (7 lb 6 oz) M Vag-Spont EPI N BATSHEVA   1 Term 05/02/11 40w0d  3232 g (7 lb 2 oz) F Vag-Spont EPI N BATSHEVA          Past Medical History: Past Medical History:   Diagnosis Date   • Hepatitis C    • Substance abuse (CMS/HCC)       Past Surgical History Past Surgical History:   Procedure Laterality Date   • EAR TUBES Bilateral 01/01/84   • EAR TUBES     • MOUTH SURGERY        Family History: Family History   Problem Relation Age of Onset   • Bipolar disorder Mother    • Hypertension Father    • Kidney cancer Paternal Grandmother    • Diabetes Maternal Grandfather    • ADD / ADHD Neg Hx    • Suicide Attempts Neg Hx    • Self-Injurious Behavior  Neg Hx    • Alcohol abuse Neg Hx       Social History:  reports that she has been smoking.  She started smoking about 10 years ago. She has a 10.00 pack-year smoking history. She uses smokeless tobacco.   reports that she does not drink alcohol.   reports that she uses drugs, including Benzodiazepines and Other.        Review of Systems      Objective     Vital Signs Range for the last 24 hours  Temperature: Temp:  [96.3 °F (35.7 °C)-97.7 °F (36.5 °C)] 97.5 °F (36.4 °C)   Temp Source: Temp src: Oral   BP: BP: ()/(46-71) 102/63   Pulse: Heart Rate:  [] 78   Respirations: Resp:  [18-20] 20   Weight: Weight:  [90.5 kg (199 lb 8 oz)] 90.5 kg (199 lb 8 oz)     Physical Examination: General appearance - alert, well appearing, and in no distress, oriented to person, place, and time, normal appearing weight and well hydrated  Mental status - alert, oriented to person, place, and time, normal mood, behavior, speech, dress, motor activity, and thought processes, affect appropriate to mood  Neck - supple, no significant adenopathy  Chest - clear to auscultation, no wheezes, rales or rhonchi,  symmetric air entry, no tachypnea, retractions or cyanosis  Heart - normal rate, regular rhythm, normal S1, S2, no murmurs, rubs, clicks or gallops  Abdomen - soft, nontender, gravid uterus, no masses or organomegaly  no rebound tenderness noted,   Pelvic - normal external genitalia, vulva, vagina, cervix, uterus and adnexa  Neurological - alert, oriented, normal speech, no focal findings or movement disorder noted  Musculoskeletal - no joint tenderness, deformity or swelling  Extremities - peripheral pulses normal, no pedal edema, no clubbing or cyanosis  Skin - normal coloration and turgor, no rashes, no suspicious skin lesions noted        Cervix: Exam by:     Dilation:     Effacement:     Station:       Laboratory Results:   Lab Results (last 24 hours)     Procedure Component Value Units Date/Time    CBC & Differential [[id number]] Collected:  10/08/18 0145    Specimen:  Blood Updated:  10/08/18 0216    Narrative:       The following orders were created for panel order CBC & Differential.  Procedure                               Abnormality         Status                     ---------                               -----------         ------                     Manual Differential[[id number]]          Abnormal            Final result               Scan Slide[[id number]]                                       Final result               CBC Auto Differential[[id number]]        Abnormal            Final result                 Please view results for these tests on the individual orders.    CBC Auto Differential [[id number]]  (Abnormal) Collected:  10/08/18 0145    Specimen:  Blood Updated:  10/08/18 0216     WBC 12.91 (H) 10*3/mm3      RBC 4.48 10*6/mm3      Hemoglobin 13.5 g/dL      Hematocrit 38.4 %      MCV 85.7 fL      MCH 30.1 pg      MCHC 35.2 g/dL      RDW 13.7 %      RDW-SD 42.5 fl      MPV 10.3 (H) fL      Platelets 258 10*3/mm3     Scan Slide [[id number]] Collected:  10/08/18 0145    Specimen:  Blood Updated:   10/08/18 0216     Scan Slide --     Comment: See Manual Differential Results       Manual Differential [[id number]]  (Abnormal) Collected:  10/08/18 0145    Specimen:  Blood Updated:  10/08/18 0216     Neutrophil % 68.0 %      Lymphocyte % 25.0 %      Monocyte % 7.0 %      Neutrophils Absolute 8.78 (H) 10*3/mm3      Lymphocytes Absolute 3.23 (H) 10*3/mm3      Monocytes Absolute 0.90 10*3/mm3      RBC Morphology Normal     Platelet Morphology Normal    Group B Streptococcus Culture - Swab, Vaginal/Rectum [[id number]] Resulted:  09/19/18     Specimen:  Swab from Vaginal/Rectum Updated:  10/08/18 0018     External Strep Group B Ag Negative    URINE DRUG SCREEN PLUS BUPRENORPHINE - [[id number]] Collected:  10/07/18 2248     Updated:  10/07/18 2334    Narrative:       The following orders were created for panel order URINE DRUG SCREEN PLUS BUPRENORPHINE -.  Procedure                               Abnormality         Status                     ---------                               -----------         ------                     Urine Drug Screen - Urin...[[id number]]  Abnormal            Final result               Buprenorphine Screen Uri...[[id number]]                                                   Please view results for these tests on the individual orders.    Urine Drug Screen - Urine, Clean Catch [[id number]]  (Abnormal) Collected:  10/07/18 2248    Specimen:  Urine from Urine, Clean Catch Updated:  10/07/18 2334     Amphetamine Screen, Urine Negative     Barbiturates Screen, Urine Negative     Benzodiazepine Screen, Urine Negative     Cocaine Screen, Urine Negative     Methadone Screen, Urine Negative     Opiate Screen Negative     Phencyclidine (PCP), Urine Negative     THC, Screen, Urine Negative     6-ACETYL MORPHINE Negative     Buprenorphine, Screen, Urine Positive (A)     Oxycodone Screen, Urine Negative    Narrative:       Negative Thresholds For Drugs Screened:                  Amphetamines              1000  ng/ml               Barbiturates               200 ng/ml               Benzodiazepines            200 ng/ml              Cocaine                    300 ng/ml              Methadone                  300 ng/ml              Opiates                    300 ng/ml               Phencyclidine               25 ng/ml               THC                         50 ng/ml              6-Acetyl Morphine           10 ng/ml              Buprenorphine                5 ng/ml              Oxycodone                  300 ng/ml    The reference range for all drugs tested is negative. This report includes final unconfirmed qualitative results to be used for medical treatment purposes only. Unconfirmed results must not be used for non-medical purposes such as employment or legal testing. Clinical consideration should be applied to any drug of abuse test, especially when unconfirmed quantitative results are used.          Radiology Review:   Imaging Results (last 72 hours)     ** No results found for the last 72 hours. **            Assessment/Plan     Active Problems:    Pregnancy    Normal labor      Assessment & Plan    Assessment:  1.  Intrauterine pregnancy at 39w0d weeks gestation with reassuring fetal status.    2.  labor  without ROM  3.  Obstetrical history significant for is non-contributory.  4.  GBS status: No results found for: GBSANTIGEN    Plan:  1. fetal and uterine monitoring  continuously and expectant management  2. Plan of care has been reviewed with patient   3.  Risks, benefits of treatment plan have been discussed.  4.  All questions have been answered.        Chery Maxwell DO  10/8/2018  9:56 AM      Electronically signed by Chery Maxwell DO at 10/8/2018  9:56 AM          Operative/Procedure Notes (all)      Chery Maxwell DO at 10/8/2018  9:56 AM  Version 1 of 1         Vaginal Delivery Procedure Note    Chinmay Thomas  Gestational Age-39.0 weeks        OBGYN: Chery  Hans SMALL      Pre-op Diagnosis:   24 y/o  @ 39.0 weeks in active labor      Anesthesia: Epidural        Detailed Description of Procedure     The patient was prepped and draped in normal sterile fashion. The head was delivered without difficulty. There was a nuchal cord x 1. Anterior and posterior shoulders delivered without any problems. The rest of the infant was delivered in controlled fashion.The infant was bulb suctioned at delivery. The placenta delivered intact. The patient tolerated the procedure well and went to the recovery room in stable condition.        Time of delivery: 932  Maternal Blood Type: O Positive  Fetal Gender: Female  Nuchal Cord: x 1  Tears: None   Blood Cord: Yes  Estimated Blood Loss: 100cc  Placenta: Spontaneous, Delivered Intact   APGARS:  9    9            Disposition: Transfer to Women's Health Floor  Condition: Stable    Chery Maxwell DO     Date: 10/8/2018  Time: 9:56 AM      Electronically signed by Chery Maxwell DO at 10/8/2018  9:58 AM       Physician Progress Notes (all)     No notes of this type exist for this encounter.

## 2018-10-10 VITALS
TEMPERATURE: 97.6 F | HEART RATE: 69 BPM | DIASTOLIC BLOOD PRESSURE: 67 MMHG | HEIGHT: 66 IN | RESPIRATION RATE: 18 BRPM | OXYGEN SATURATION: 96 % | BODY MASS INDEX: 32.06 KG/M2 | WEIGHT: 199.5 LBS | SYSTOLIC BLOOD PRESSURE: 113 MMHG

## 2018-10-10 PROBLEM — Z34.90 PREGNANCY: Status: RESOLVED | Noted: 2018-10-07 | Resolved: 2018-10-10

## 2018-10-10 PROBLEM — Z37.9 NORMAL LABOR: Status: RESOLVED | Noted: 2018-10-08 | Resolved: 2018-10-10

## 2018-10-10 PROBLEM — R76.8 HEPATITIS C ANTIBODY POSITIVE IN BLOOD: Chronic | Status: ACTIVE | Noted: 2018-10-10

## 2018-10-10 LAB
BASOPHILS # BLD AUTO: 0.02 10*3/MM3 (ref 0–0.3)
BASOPHILS NFR BLD AUTO: 0.2 % (ref 0–2)
DEPRECATED RDW RBC AUTO: 43.1 FL (ref 37–54)
EOSINOPHIL # BLD AUTO: 0.11 10*3/MM3 (ref 0–0.7)
EOSINOPHIL NFR BLD AUTO: 1.2 % (ref 0–5)
ERYTHROCYTE [DISTWIDTH] IN BLOOD BY AUTOMATED COUNT: 13.6 % (ref 11.5–14.5)
HCT VFR BLD AUTO: 37.4 % (ref 37–47)
HGB BLD-MCNC: 12.9 G/DL (ref 12–16)
IMM GRANULOCYTES # BLD: 0.12 10*3/MM3 (ref 0–0.03)
IMM GRANULOCYTES NFR BLD: 1.3 % (ref 0–0.5)
LYMPHOCYTES # BLD AUTO: 3.55 10*3/MM3 (ref 1–3)
LYMPHOCYTES NFR BLD AUTO: 38.8 % (ref 21–51)
MCH RBC QN AUTO: 30 PG (ref 27–33)
MCHC RBC AUTO-ENTMCNC: 34.5 G/DL (ref 33–37)
MCV RBC AUTO: 87 FL (ref 80–94)
MONOCYTES # BLD AUTO: 0.96 10*3/MM3 (ref 0.1–0.9)
MONOCYTES NFR BLD AUTO: 10.5 % (ref 0–10)
NEUTROPHILS # BLD AUTO: 4.38 10*3/MM3 (ref 1.4–6.5)
NEUTROPHILS NFR BLD AUTO: 48 % (ref 30–70)
NRBC BLD MANUAL-RTO: 0 /100 WBC (ref 0–0)
PLATELET # BLD AUTO: 227 10*3/MM3 (ref 130–400)
PMV BLD AUTO: 10.4 FL (ref 6–10)
RBC # BLD AUTO: 4.3 10*6/MM3 (ref 4.2–5.4)
WBC NRBC COR # BLD: 9.14 10*3/MM3 (ref 4.5–12.5)

## 2018-10-10 PROCEDURE — 85025 COMPLETE CBC W/AUTO DIFF WBC: CPT | Performed by: OBSTETRICS & GYNECOLOGY

## 2018-10-10 RX ORDER — IBUPROFEN 600 MG/1
600 TABLET ORAL EVERY 6 HOURS PRN
Qty: 40 TABLET | Refills: 1 | Status: ON HOLD | OUTPATIENT
Start: 2018-10-10 | End: 2019-10-11

## 2018-10-10 RX ADMIN — BUPRENORPHINE HCL 8 MG: 2 TABLET SUBLINGUAL at 09:05

## 2018-10-10 RX ADMIN — IBUPROFEN 800 MG: 800 TABLET, FILM COATED ORAL at 05:02

## 2018-10-10 RX ADMIN — GUAIFENESIN 1200 MG: 600 TABLET, EXTENDED RELEASE ORAL at 09:05

## 2018-10-10 RX ADMIN — DOCUSATE SODIUM 100 MG: 100 CAPSULE ORAL at 09:05

## 2018-10-10 NOTE — PLAN OF CARE
Problem: Patient Care Overview  Goal: Plan of Care Review  Outcome: Ongoing (interventions implemented as appropriate)   10/10/18 0240   Coping/Psychosocial   Plan of Care Reviewed With patient   Plan of Care Review   Progress improving     Goal: Individualization and Mutuality  Outcome: Ongoing (interventions implemented as appropriate)    Goal: Discharge Needs Assessment  Outcome: Ongoing (interventions implemented as appropriate)      Problem: Postpartum (Vaginal Delivery) (Adult,Obstetrics,Pediatric)  Goal: Signs and Symptoms of Listed Potential Problems Will be Absent, Minimized or Managed (Postpartum)  Outcome: Ongoing (interventions implemented as appropriate)

## 2018-10-10 NOTE — DISCHARGE SUMMARY
"[facility]  Delivery Discharge Summary    Primary OB Clinician:     EDC: Estimated Date of Delivery: 10/15/18    Gestational Age:39w0d    Antepartum complications: Hepatitis C Ab +, Subutex    Date of Delivery: 10/8/2018   Time of Delivery: 9:32 AM     Delivered By:  Chery Maxwell     Delivery Type: Vaginal, Spontaneous Delivery      Baby: Female  Apgar:  9   @ 1 minute /   Apgar:  9   @ 5 minutes   Weight: 3334 grams    Anesthesia: Epidural      Intrapartum complications: None    Subjective   Subjective  Postpartum Day 2: Vaginal Delivery    The patient feels well.  Her pain is well controlled with nonsteroidal anti-inflammatory drugs.   She is ambulating well.  Patient describes her bleeding as thin lochia.    Breastfeeding: declines.    Objective     Objective   Vital Signs Range for the last 24 hours  Temperature: Temp:  [97.6 °F (36.4 °C)-97.9 °F (36.6 °C)] 97.6 °F (36.4 °C)   Temp Source: Temp src: Oral   BP: BP: (113-139)/(67-88) 113/67   Pulse: Heart Rate:  [69-85] 69   Respirations: Resp:  [18] 18   Weight:       Admit Height:  Height: 167.6 cm (66\")    Physical Exam:  General:  no acute distresss.  Abdomen: Fundus: appropriate, firm, non tender  Extremities: normal, atraumatic, no cyanosis, and trace edema.     [unfilled]       Lab Results   Component Value Date    ABO O 10/08/2018    RH Positive 10/08/2018        Lab Results   Component Value Date    HGB 12.9 10/10/2018    HCT 37.4 10/10/2018         Assesment and Plan:  Postpartum Care Vaginal Delivery    Follow-up appointment with [facility] in 3 weeks.    Discharge Date: 10/10/2018; Discharge Time: 10:18 AM        CANDE Farrell  10/10/2018  10:17 AM      "

## 2018-10-10 NOTE — PAYOR COMM NOTE
"CONTACT:  NANI LEO RN, BSN  UTILIZATION MANAGEMENT DEPT.  [address], [address]  PHONE:  [phone]  FAX: [phone]    PATIENT DISCHARGED TO HOME ON 10/10/18. REFER TO AUTH # [id number].    PLEASE NOTE THAT PATIENT'S CORRECT ADMISSION DATE WAS 10/8/18, PATIENT WAS IN AN OUTPATIENT STATUS ON 10/7/18 AND WENT INPATIENT ON 10/8/18, SEE INPATIENT ORDER BELOW.    Carlos Manuel Thomas (23 y.o. Female)     Date of Birth Social Security Number Address Home Phone MRN    1995  [address] [facility] [address] [phone] [id number]    Latter-day Marital Status          None Single       Admission Date Admission Type Admitting Provider Attending Provider Department, Room/Bed    10/8/18 Elective Chery Maxwell, DO  [facility], W239/1    Discharge Date Discharge Disposition Discharge Destination        10/10/2018 Home or Self Care Home             Attending Provider:  (none)   Allergies:  No Known Allergies    Isolation:  None   Infection:  None   Code Status:  Prior    Ht:  167.6 cm (66\")   Wt:  90.5 kg (199 lb 8 oz)    Admission Cmt:  None   Principal Problem:  None                Active Insurance as of 10/7/2018     Primary Coverage     Payor Plan Insurance Group Employer/Plan Group    ANTHEM MEDICAID ANTHEM MEDICAID [id number]     Payor Plan Address Payor Plan Phone Number Effective From Effective To    [address] [address] [phone] 7/1/2018     [facility] [address]       Subscriber Name Subscriber Birth Date Member ID       LYNNCARLOS MANUEL REYNA 1995 [id number]                 Emergency Contacts      (Rel.) Home Phone Work Phone Mobile Phone    Geovanna Thomas (Relative) -- -- [phone]            Orders (all)     Start     Ordered    10/10/18 1021  Discharge patient  Once      10/10/18 1020    10/08/18 0716  Admit To Obstetrics Inpatient  Once      10/08/18 0717          Discharge Summary    " "  Kaiden Roland PA at 10/10/2018 10:17 AM           Sebastian  Delivery Discharge Summary    Primary OB Clinician:     EDC: Estimated Date of Delivery: 10/15/18    Gestational Age:39w0d    Antepartum complications: Hepatitis C Ab +, Subutex    Date of Delivery: 10/8/2018   Time of Delivery: 9:32 AM     Delivered By:  Chery Maxwell     Delivery Type: Vaginal, Spontaneous Delivery      Baby: Female  Apgar:  9   @ 1 minute /   Apgar:  9   @ 5 minutes   Weight: 3334 grams    Anesthesia: Epidural      Intrapartum complications: None    Subjective   Subjective  Postpartum Day 2: Vaginal Delivery    The patient feels well.  Her pain is well controlled with nonsteroidal anti-inflammatory drugs.   She is ambulating well.  Patient describes her bleeding as thin lochia.    Breastfeeding: declines.    Objective     Objective   Vital Signs Range for the last 24 hours  Temperature: Temp:  [97.6 °F (36.4 °C)-97.9 °F (36.6 °C)] 97.6 °F (36.4 °C)   Temp Source: Temp src: Oral   BP: BP: (113-139)/(67-88) 113/67   Pulse: Heart Rate:  [69-85] 69   Respirations: Resp:  [18] 18   Weight:       Admit Height:  Height: 167.6 cm (66\")    Physical Exam:  General:  no acute distresss.  Abdomen: Fundus: appropriate, firm, non tender  Extremities: normal, atraumatic, no cyanosis, and trace edema.     [unfilled]       Lab Results   Component Value Date    ABO O 10/08/2018    RH Positive 10/08/2018        Lab Results   Component Value Date    HGB 12.9 10/10/2018    HCT 37.4 10/10/2018         Assesment and Plan:  Postpartum Care Vaginal Delivery    Follow-up appointment with [facility] in 3 weeks.    Discharge Date: 10/10/2018; Discharge Time: 10:18 AM        CANDE Farrell  10/10/2018  10:17 AM        Electronically signed by Jere Parry III, MD at 10/10/2018 10:27 AM       "

## 2018-10-24 ENCOUNTER — OFFICE VISIT (OUTPATIENT)
Dept: PSYCHIATRY | Facility: CLINIC | Age: 23
End: 2018-10-24

## 2018-10-24 ENCOUNTER — PRIOR AUTHORIZATION (OUTPATIENT)
Dept: PSYCHIATRY | Facility: CLINIC | Age: 23
End: 2018-10-24

## 2018-10-24 ENCOUNTER — TELEPHONE (OUTPATIENT)
Dept: PSYCHIATRY | Facility: CLINIC | Age: 23
End: 2018-10-24

## 2018-10-24 VITALS
SYSTOLIC BLOOD PRESSURE: 116 MMHG | HEART RATE: 81 BPM | HEIGHT: 66 IN | BODY MASS INDEX: 32.33 KG/M2 | DIASTOLIC BLOOD PRESSURE: 74 MMHG | WEIGHT: 201.2 LBS

## 2018-10-24 DIAGNOSIS — F11.20 UNCOMPLICATED OPIOID DEPENDENCE (HCC): Primary | ICD-10-CM

## 2018-10-24 DIAGNOSIS — F13.21 BENZODIAZEPINE DEPENDENCE IN REMISSION (HCC): ICD-10-CM

## 2018-10-24 PROCEDURE — 99214 OFFICE O/P EST MOD 30 MIN: CPT | Performed by: NURSE PRACTITIONER

## 2018-10-24 RX ORDER — BUPRENORPHINE HYDROCHLORIDE AND NALOXONE HYDROCHLORIDE DIHYDRATE 8; 2 MG/1; MG/1
1 TABLET SUBLINGUAL 2 TIMES DAILY
Qty: 28 TABLET | Refills: 0 | Status: SHIPPED | OUTPATIENT
Start: 2018-10-24 | End: 2018-11-07 | Stop reason: SDUPTHER

## 2018-10-24 NOTE — TELEPHONE ENCOUNTER
Ok.  By law she has to be switched to a buprenorphine/naltrexone after delivery so it should go through

## 2018-10-24 NOTE — PROGRESS NOTES
Subjective   Chinmay Thomas is a 23 y.o. female is here today for medication management follow-up     Chief Complaint: Recheck on MAT post partum  History of Present Illness:  Pt has given birth 2 weeks ago.  She states she is doing well physically.  She currently denies any depression or anxiety.  She states that her cravings are stable.  She continues to be a resident of the [facility].  Her last stay at the [facility] could be upcoming on November 7.  She is not sure if she will be placed into an apartment or possibly a rental house of her father's.  She is attempting to breast-feed but states the baby is not getting much breastmilk as she is not producing a lot and she is supplementing with formula.  He is up several times a night for feedings at the present time.Body mass index is 32.47 kg/m².  She does not have any appetite changes.  She states she is very happy with the baby and happy with her progress so far.  She has not noticed any increased irritability or any distress in the infant.  She denies any mood swings.          TThe following portions of the patient's history were reviewed and updated as appropriate: allergies, current medications, past family history, past medical history, past social history, past surgical history and problem list.    Review of Systems   Constitutional: Negative for activity change, appetite change and fatigue.   HENT: Negative.    Eyes: Negative for visual disturbance.   Respiratory: Negative.    Cardiovascular: Negative.    Gastrointestinal: Negative for nausea.   Endocrine: Negative.    Genitourinary: Negative.    Musculoskeletal: Negative for arthralgias.   Skin: Negative.    Allergic/Immunologic: Negative.    Neurological: Negative for dizziness, seizures and headaches.   Hematological: Negative.    Psychiatric/Behavioral: Positive for sleep disturbance. Negative for agitation, behavioral problems, confusion, decreased concentration, dysphoric mood,  "hallucinations, self-injury and suicidal ideas. The patient is not nervous/anxious and is not hyperactive.        Objective   Physical Exam   Constitutional: She is oriented to person, place, and time. She appears well-developed and well-nourished.   Neurological: She is alert and oriented to person, place, and time.   Psychiatric: She has a normal mood and affect. Her speech is normal and behavior is normal. Judgment and thought content normal. Cognition and memory are normal.   Has infant with her and appears very loving and affectionate with the baby   Nursing note and vitals reviewed.    Blood pressure 116/74, pulse 81, height 167.6 cm (66\"), weight 91.3 kg (201 lb 3.2 oz), unknown if currently breastfeeding.    Medication List:   Current Outpatient Prescriptions   Medication Sig Dispense Refill   • buprenorphine-naloxone (SUBOXONE) 8-2 MG per SL tablet Place 1 tablet under the tongue 2 (Two) Times a Day. 28 tablet 0   • ibuprofen (ADVIL,MOTRIN) 600 MG tablet Take 1 tablet by mouth Every 6 (Six) Hours As Needed for Mild Pain . 40 tablet 1   • polyethylene glycol (MIRALAX) powder Mix 17 grams (1 capful) with 8 oz. water, juice, soda, coffee or tea and drink every day. 527 g 0   • PRENATAL 28-0.8 MG tablet Take 1 tablet by mouth Daily. 30 each 6   • Prenatal Vit-Fe Fumarate-FA (PRENATAL VITAMIN 27-0.8) 27-0.8 MG tablet tablet Take 1 tablet by mouth Daily. 30 tablet 0     No current facility-administered medications for this visit.        Mental Status Exam:   Hygiene:   good  Cooperation:  Cooperative  Eye Contact:  Good  Psychomotor Behavior:  Appropriate  Affect:  Full range  Hopelessness: Denies  Speech:  Normal  Thought Process:  Goal directed  Thought Content:  Normal  Suicidal:  None  Homicidal:  None  Hallucinations:  None  Delusion:  None  Memory:  Intact  Orientation:  Person, Place, Time and Situation  Reliability:  good  Insight:  Good  Judgement:  Fair  Impulse Control:  Fair  Physical/Medical Issues:  " Yes pregnancy    Assessment/Plan   Problems Addressed this Visit     Uncomplicated opioid dependence (CMS/HCC) - Primary    Relevant Medications    buprenorphine-naloxone (SUBOXONE) 8-2 MG per SL tablet      Other Visit Diagnoses     Benzodiazepine dependence in remission (CMS/HCC)            Functionality: pt having minimal impairment in important areas of daily functioning.  Prognosis: Guarded dependent on medication/follow up and treatment plan compliance.     Patient is to continue her counseling and educational classes at the [facility].   I have switched her medication to the buprenorphine/naltrexone combination.   Reviewed the risks, benefits, and side effects of the medications; patient acknowledged and verbally consented. I did discuss the breastfeeding warnings associated with the combination medication.  Advised pt to feed the baby prior to taking the medication.  She is to watch the baby for any signs of distress.  She is also to notify the pediatrician of the medication change.  Patient is agreeable to call the [facility].  Patient is aware to call 911 or go to the nearest ER should begin having SI/HI.  I will have her RTC on Nov 7th and will assess if she is moving out of the [facility] or staying on for an extension.

## 2018-10-30 ENCOUNTER — SPECIALTY PHARMACY (OUTPATIENT)
Dept: PHARMACY | Facility: HOSPITAL | Age: 23
End: 2018-10-30

## 2018-10-30 RX ORDER — NALOXONE HYDROCHLORIDE 4 MG/.1ML
1 SPRAY NASAL AS NEEDED
Qty: 2 EACH | Refills: 0 | Status: SHIPPED | OUTPATIENT
Start: 2018-10-30 | End: 2018-11-07

## 2018-11-07 ENCOUNTER — OFFICE VISIT (OUTPATIENT)
Dept: PSYCHIATRY | Facility: CLINIC | Age: 23
End: 2018-11-07

## 2018-11-07 VITALS
WEIGHT: 201.4 LBS | HEIGHT: 66 IN | SYSTOLIC BLOOD PRESSURE: 119 MMHG | DIASTOLIC BLOOD PRESSURE: 77 MMHG | HEART RATE: 76 BPM | BODY MASS INDEX: 32.37 KG/M2

## 2018-11-07 DIAGNOSIS — F11.20 UNCOMPLICATED OPIOID DEPENDENCE (HCC): Primary | ICD-10-CM

## 2018-11-07 DIAGNOSIS — F13.21 BENZODIAZEPINE DEPENDENCE IN REMISSION (HCC): ICD-10-CM

## 2018-11-07 PROCEDURE — 99214 OFFICE O/P EST MOD 30 MIN: CPT | Performed by: NURSE PRACTITIONER

## 2018-11-07 RX ORDER — BUPRENORPHINE HYDROCHLORIDE AND NALOXONE HYDROCHLORIDE DIHYDRATE 8; 2 MG/1; MG/1
1 TABLET SUBLINGUAL 2 TIMES DAILY
Qty: 44 TABLET | Refills: 0 | Status: SHIPPED | OUTPATIENT
Start: 2018-11-07 | End: 2018-11-26 | Stop reason: SDUPTHER

## 2018-11-07 NOTE — PROGRESS NOTES
Subjective   Chinmay Thomas is a 23 y.o. female is here today for medication management follow-up     Chief Complaint: Recheck on MAT post partum  History of Present Illness: Leaving the [facility] and moving to [place] to live with her father.  She is going to be on supervision for 30 days per  and get random drug screens.  Denies depression.  Minor anxiety about the change.  She says that her father and stepmother are a good support system.  There are no substance users in the home.  Body mass index is 32.51 kg/m². no appetite changes.  No current medical stressors.  Denies any cravings. No mood swings.  Getting several hours sleep it is interrupted with infant feeding.        TThe following portions of the patient's history were reviewed and updated as appropriate: allergies, current medications, past family history, past medical history, past social history, past surgical history and problem list.    Review of Systems   Constitutional: Negative for activity change, appetite change and fatigue.   HENT: Negative.    Eyes: Negative for visual disturbance.   Respiratory: Negative.    Cardiovascular: Negative.    Gastrointestinal: Negative for nausea.   Endocrine: Negative.    Genitourinary: Negative.    Musculoskeletal: Negative for arthralgias.   Skin: Negative.    Allergic/Immunologic: Negative.    Neurological: Negative for dizziness, seizures and headaches.   Hematological: Negative.    Psychiatric/Behavioral: Positive for sleep disturbance. Negative for agitation, behavioral problems, confusion, decreased concentration, dysphoric mood, hallucinations, self-injury and suicidal ideas. The patient is not nervous/anxious and is not hyperactive.        Objective   Physical Exam   Constitutional: She is oriented to person, place, and time. She appears well-developed and well-nourished.   Neurological: She is alert and oriented to person, place, and time.   Psychiatric: She has a  "normal mood and affect. Her speech is normal and behavior is normal. Judgment and thought content normal. Cognition and memory are normal.   Nursing note and vitals reviewed.    Blood pressure 119/77, pulse 76, height 167.6 cm (66\"), weight 91.4 kg (201 lb 6.4 oz), unknown if currently breastfeeding.    Medication List:   Current Outpatient Prescriptions   Medication Sig Dispense Refill   • buprenorphine-naloxone (SUBOXONE) 8-2 MG per SL tablet Dissolve 1 tablet under the tongue 2 (Two) Times a Day. 44 tablet 0   • ibuprofen (ADVIL,MOTRIN) 600 MG tablet Take 1 tablet by mouth Every 6 (Six) Hours As Needed for Mild Pain . 40 tablet 1   • polyethylene glycol (MIRALAX) powder Mix 17 grams (1 capful) with 8 oz. water, juice, soda, coffee or tea and drink every day. 527 g 0   • PRENATAL 28-0.8 MG tablet Take 1 tablet by mouth Daily. 30 each 6   • Prenatal Vit-Fe Fumarate-FA (PRENATAL VITAMIN 27-0.8) 27-0.8 MG tablet tablet Take 1 tablet by mouth Daily. 30 tablet 0     No current facility-administered medications for this visit.        Mental Status Exam:   Hygiene:   good  Cooperation:  Cooperative  Eye Contact:  Good  Psychomotor Behavior:  Appropriate  Affect:  Full range  Hopelessness: Denies  Speech:  Normal  Thought Process:  Goal directed  Thought Content:  Normal  Suicidal:  None  Homicidal:  None  Hallucinations:  None  Delusion:  None  Memory:  Intact  Orientation:  Person, Place, Time and Situation  Reliability:  good  Insight:  Good  Judgement:  Fair  Impulse Control:  Fair  Physical/Medical Issues:  Yes pregnancy    Assessment/Plan   Problems Addressed this Visit     Uncomplicated opioid dependence (CMS/HCC) - Primary    Relevant Medications    buprenorphine-naloxone (SUBOXONE) 8-2 MG per SL tablet      Other Visit Diagnoses     Benzodiazepine dependence in remission (CMS/HCC)            Functionality: pt having minimal impairment in important areas of daily functioning.  Prognosis: Guarded dependent on " medication/follow up and treatment plan compliance.  Chin reviewed.  UDS obtained today.  Past ones have been appropriate.      Pt is going to be set  up for therapy sessions at [facility] yamile a certified drug counselor.  She is to attend N/a meetings and bring in documentation of this.  Patient is agreeable to call the [facility].  Patient is aware to call 911 or go to the nearest ER should begin having SI/HI.  I will have her RTC on Nov 29th and will assess

## 2018-11-26 ENCOUNTER — TELEPHONE (OUTPATIENT)
Dept: PSYCHIATRY | Facility: CLINIC | Age: 23
End: 2018-11-26

## 2018-11-26 DIAGNOSIS — F11.20 UNCOMPLICATED OPIOID DEPENDENCE (HCC): ICD-10-CM

## 2018-11-26 RX ORDER — BUPRENORPHINE HYDROCHLORIDE AND NALOXONE HYDROCHLORIDE DIHYDRATE 8; 2 MG/1; MG/1
1 TABLET SUBLINGUAL 2 TIMES DAILY
Qty: 8 TABLET | Refills: 0 | Status: SHIPPED | OUTPATIENT
Start: 2018-11-26 | End: 2018-11-29

## 2018-11-26 NOTE — TELEPHONE ENCOUNTER
Patient called [facility] to have those transfer to [facility] in [place], she was made aware that the provider would have to call in order for those to be transfer . The contact number for [facility] is [phone]. Please Advise

## 2018-11-26 NOTE — PROGRESS NOTES
Pt called in and had ran out of buprenorphine.  Her prescription indicated she should not be out.  I called [facility] pharmacy and they had only given her 14 days of medications.  This is verified on the Chin.  That is all the medication they had.  I then sent in the 4 days worth to [facility].  Called and cancelled remaining pills at the [facility] pharmacy.

## 2018-11-26 NOTE — TELEPHONE ENCOUNTER
She said she had moved back home and was wondering if those remaining  could be transfer to a pharmacy closer to her I made her aware that I'm not sure if those could be transfer but I would ask to make sure . Please Advise

## 2018-11-26 NOTE — TELEPHONE ENCOUNTER
Call and tell her she had enough at the pharmacy they must have only given her what they had and someone should have told her to come back after 14 days.  I called the pharmacy and it is there

## 2018-11-29 ENCOUNTER — OFFICE VISIT (OUTPATIENT)
Dept: PSYCHIATRY | Facility: CLINIC | Age: 23
End: 2018-11-29

## 2018-11-29 VITALS
DIASTOLIC BLOOD PRESSURE: 82 MMHG | WEIGHT: 204.2 LBS | HEIGHT: 66 IN | SYSTOLIC BLOOD PRESSURE: 133 MMHG | BODY MASS INDEX: 32.82 KG/M2 | HEART RATE: 101 BPM | OXYGEN SATURATION: 98 %

## 2018-11-29 DIAGNOSIS — F11.20 OPIOID DEPENDENCE ON AGONIST THERAPY (HCC): Primary | ICD-10-CM

## 2018-11-29 PROCEDURE — 99214 OFFICE O/P EST MOD 30 MIN: CPT | Performed by: NURSE PRACTITIONER

## 2018-11-29 RX ORDER — BUPRENORPHINE HYDROCHLORIDE AND NALOXONE HYDROCHLORIDE DIHYDRATE 8; 2 MG/1; MG/1
1 TABLET SUBLINGUAL DAILY
Qty: 28 TABLET | Refills: 0 | Status: SHIPPED | OUTPATIENT
Start: 2018-11-29 | End: 2018-12-27 | Stop reason: SDUPTHER

## 2018-11-29 RX ORDER — BUPRENORPHINE HYDROCHLORIDE AND NALOXONE HYDROCHLORIDE DIHYDRATE 2; .5 MG/1; MG/1
TABLET SUBLINGUAL
Qty: 84 TABLET | Refills: 0 | Status: SHIPPED | OUTPATIENT
Start: 2018-11-29 | End: 2018-12-27 | Stop reason: SDUPTHER

## 2018-11-29 NOTE — PROGRESS NOTES
Subjective   Chinmay Thomas is a 23 y.o. female is here today for medication management follow-up     Chief Complaint: Recheck on MAT post partum  History of Present Illness: Pt is currently living with her father.  States things are going well.  She denies depression.  Denies any suicidal thoughts, homicidal thoughts, or any A/V hallucinations.  Body mass index is 32.96 kg/m².No appetite changes.  Sleeping well at least 7 hours a night.  Up with the infant for feedings at times.  She does have some anxiety as she has a court date Monday regarding her children.  She wants to begin tapering the medication to prove to the courts she is trying.  She denies any cravings.           TThe following portions of the patient's history were reviewed and updated as appropriate: allergies, current medications, past family history, past medical history, past social history, past surgical history and problem list.    Review of Systems   Constitutional: Negative for activity change, appetite change and fatigue.   HENT: Negative.    Eyes: Negative for visual disturbance.   Respiratory: Negative.    Cardiovascular: Negative.    Gastrointestinal: Negative for nausea.   Endocrine: Negative.    Genitourinary: Negative.    Musculoskeletal: Negative for arthralgias.   Skin: Negative.    Allergic/Immunologic: Negative.    Neurological: Negative for dizziness, seizures and headaches.   Hematological: Negative.    Psychiatric/Behavioral: Negative for agitation, behavioral problems, confusion, decreased concentration, dysphoric mood, hallucinations, self-injury, sleep disturbance and suicidal ideas. The patient is not nervous/anxious and is not hyperactive.        Objective   Physical Exam   Constitutional: She is oriented to person, place, and time. She appears well-developed and well-nourished.   Neurological: She is alert and oriented to person, place, and time.   Psychiatric: She has a normal mood and affect. Her speech is  "normal and behavior is normal. Judgment and thought content normal. Cognition and memory are normal.   Nursing note and vitals reviewed.    Blood pressure 133/82, pulse 101, height 167.6 cm (66\"), weight 92.6 kg (204 lb 3.2 oz), SpO2 98 %, unknown if currently breastfeeding.    Medication List:   Current Outpatient Medications   Medication Sig Dispense Refill   • buprenorphine-naloxone (SUBOXONE) 2-0.5 MG per SL tablet Take 3 tablets of the evening 84 tablet 0   • buprenorphine-naloxone (SUBOXONE) 8-2 MG per SL tablet Place 1 tablet under the tongue Daily. In the am 28 tablet 0   • ibuprofen (ADVIL,MOTRIN) 600 MG tablet Take 1 tablet by mouth Every 6 (Six) Hours As Needed for Mild Pain . 40 tablet 1   • polyethylene glycol (MIRALAX) powder Mix 17 grams (1 capful) with 8 oz. water, juice, soda, coffee or tea and drink every day. 527 g 0   • PRENATAL 28-0.8 MG tablet Take 1 tablet by mouth Daily. 30 each 6   • Prenatal Vit-Fe Fumarate-FA (PRENATAL VITAMIN 27-0.8) 27-0.8 MG tablet tablet Take 1 tablet by mouth Daily. 30 tablet 0     No current facility-administered medications for this visit.        Mental Status Exam:   Hygiene:   good  Cooperation:  Cooperative  Eye Contact:  Good  Psychomotor Behavior:  Appropriate  Affect:  Full range  Hopelessness: Denies  Speech:  Normal  Thought Process:  Goal directed  Thought Content:  Normal  Suicidal:  None  Homicidal:  None  Hallucinations:  None  Delusion:  None  Memory:  Intact  Orientation:  Person, Place, Time and Situation  Reliability:  good  Insight:  Good  Judgement:  Fair  Impulse Control:  Fair  Physical/Medical Issues:  Yes pregnancy    Assessment/Plan   Problems Addressed this Visit     None      Visit Diagnoses     Opioid dependence on agonist therapy (CMS/[facility])    -  Primary    Relevant Medications    buprenorphine-naloxone (SUBOXONE) 8-2 MG per SL tablet    buprenorphine-naloxone (SUBOXONE) 2-0.5 MG per SL tablet        Functionality: pt having minimal " impairment in important areas of daily functioning.  Prognosis: Guarded dependent on medication/follow up and treatment plan compliance.  Chin reviewed.  UDS obtained today.  Past ones have been appropriate.      [facility] Her first upcoming visit is on Dec 10th. She has been to 5 NA meetings.  She forgot to bring the documentation.  I printed off sign in sheets for NA meetings for her and explained to her if she does not bring the appropriate documentation I cannot continue to prescribe the medication.  She verbalized understanding. She is going to start a slow taper.  She will be taking 8mg in the morning and 6mg of an evening. She asked me to provide a letter in which it states she is beginning to taper the medication that she can submit to the courts. I did the letter and gave to her. Continuing efforts to promote the therapeutic alliance, address the patient's issues, and strengthen self awareness, insights, and coping skills Patient is agreeable to call the [facility].  Patient is aware to call 911 or go to the nearest ER should begin having SI/HI.  I will have her RTC in 4 weeks.

## 2018-12-27 ENCOUNTER — TELEPHONE (OUTPATIENT)
Dept: PSYCHIATRY | Facility: CLINIC | Age: 23
End: 2018-12-27

## 2018-12-27 DIAGNOSIS — F11.20 OPIOID DEPENDENCE ON AGONIST THERAPY (HCC): ICD-10-CM

## 2018-12-27 RX ORDER — BUPRENORPHINE HYDROCHLORIDE AND NALOXONE HYDROCHLORIDE DIHYDRATE 2; .5 MG/1; MG/1
TABLET SUBLINGUAL
Qty: 12 TABLET | Refills: 0 | Status: SHIPPED | OUTPATIENT
Start: 2018-12-27 | End: 2018-12-31 | Stop reason: SDUPTHER

## 2018-12-27 RX ORDER — BUPRENORPHINE HYDROCHLORIDE AND NALOXONE HYDROCHLORIDE DIHYDRATE 8; 2 MG/1; MG/1
1 TABLET SUBLINGUAL DAILY
Qty: 4 TABLET | Refills: 0 | Status: SHIPPED | OUTPATIENT
Start: 2018-12-27 | End: 2018-12-31 | Stop reason: SDUPTHER

## 2018-12-31 ENCOUNTER — OFFICE VISIT (OUTPATIENT)
Dept: PSYCHIATRY | Facility: CLINIC | Age: 23
End: 2018-12-31

## 2018-12-31 VITALS
DIASTOLIC BLOOD PRESSURE: 73 MMHG | HEIGHT: 66 IN | BODY MASS INDEX: 33.23 KG/M2 | SYSTOLIC BLOOD PRESSURE: 111 MMHG | WEIGHT: 206.8 LBS

## 2018-12-31 DIAGNOSIS — F11.20 OPIOID DEPENDENCE ON AGONIST THERAPY (HCC): ICD-10-CM

## 2018-12-31 DIAGNOSIS — F41.1 GENERALIZED ANXIETY DISORDER: ICD-10-CM

## 2018-12-31 DIAGNOSIS — F13.21 BENZODIAZEPINE DEPENDENCE IN REMISSION (HCC): ICD-10-CM

## 2018-12-31 DIAGNOSIS — Z79.899 MEDICATION MANAGEMENT: Primary | ICD-10-CM

## 2018-12-31 LAB
AMPHETAMINE CUT-OFF: ABNORMAL
BENZODIAZIPINE CUT-OFF: ABNORMAL
BUPRENORPHINE CUT-OFF: ABNORMAL
COCAINE CUT-OFF: ABNORMAL
EXTERNAL AMPHETAMINE SCREEN URINE: NEGATIVE
EXTERNAL BENZODIAZEPINE SCREEN URINE: NEGATIVE
EXTERNAL BUPRENORPHINE SCREEN URINE: POSITIVE
EXTERNAL COCAINE SCREEN URINE: NEGATIVE
EXTERNAL MDMA: NEGATIVE
EXTERNAL METHADONE SCREEN URINE: NEGATIVE
EXTERNAL METHAMPHETAMINE SCREEN URINE: NEGATIVE
EXTERNAL OPIATES SCREEN URINE: NEGATIVE
EXTERNAL OXYCODONE SCREEN URINE: NEGATIVE
EXTERNAL THC SCREEN URINE: NEGATIVE
MDMA CUT-OFF: ABNORMAL
METHADONE CUT-OFF: ABNORMAL
METHAMPHETAMINE CUT-OFF: ABNORMAL
OPIATES CUT-OFF: ABNORMAL
OXYCODONE CUT-OFF: ABNORMAL
THC CUT-OFF: ABNORMAL

## 2018-12-31 PROCEDURE — 99214 OFFICE O/P EST MOD 30 MIN: CPT | Performed by: NURSE PRACTITIONER

## 2018-12-31 RX ORDER — PROPRANOLOL HYDROCHLORIDE 10 MG/1
10 TABLET ORAL 2 TIMES DAILY
Qty: 60 TABLET | Refills: 0 | Status: SHIPPED | OUTPATIENT
Start: 2018-12-31 | End: 2019-01-21 | Stop reason: SDUPTHER

## 2018-12-31 RX ORDER — HYDROXYZINE HYDROCHLORIDE 25 MG/1
25 TABLET, FILM COATED ORAL 3 TIMES DAILY PRN
Qty: 90 TABLET | Refills: 0 | Status: ON HOLD | OUTPATIENT
Start: 2018-12-31 | End: 2019-10-11

## 2018-12-31 RX ORDER — AMOXICILLIN 500 MG/1
CAPSULE ORAL
Status: ON HOLD | COMMUNITY
Start: 2018-12-20 | End: 2019-10-11

## 2018-12-31 RX ORDER — BUPRENORPHINE HYDROCHLORIDE AND NALOXONE HYDROCHLORIDE DIHYDRATE 8; 2 MG/1; MG/1
1 TABLET SUBLINGUAL DAILY
Qty: 14 TABLET | Refills: 0 | Status: SHIPPED | OUTPATIENT
Start: 2018-12-31 | End: 2019-01-14 | Stop reason: SDUPTHER

## 2018-12-31 RX ORDER — ESCITALOPRAM OXALATE 5 MG/1
5 TABLET ORAL DAILY
Qty: 30 TABLET | Refills: 0 | Status: SHIPPED | OUTPATIENT
Start: 2018-12-31 | End: 2019-01-21

## 2018-12-31 RX ORDER — IBUPROFEN 800 MG/1
TABLET ORAL
Status: ON HOLD | COMMUNITY
Start: 2018-12-20 | End: 2019-10-11

## 2018-12-31 RX ORDER — BUPRENORPHINE HYDROCHLORIDE AND NALOXONE HYDROCHLORIDE DIHYDRATE 2; .5 MG/1; MG/1
TABLET SUBLINGUAL
Qty: 42 TABLET | Refills: 0 | Status: SHIPPED | OUTPATIENT
Start: 2018-12-31 | End: 2019-01-14 | Stop reason: SDUPTHER

## 2018-12-31 RX ORDER — OSELTAMIVIR PHOSPHATE 75 MG/1
CAPSULE ORAL
Status: ON HOLD | COMMUNITY
Start: 2018-12-20 | End: 2019-10-11

## 2018-12-31 NOTE — PROGRESS NOTES
Subjective   Chinmay Thomas is a 23 y.o. female is here today for medication management follow-up . Presents to the [facility]    Chief Complaint: Recheck on MAT post partum  History of Present Illness:  Pt states she is having more anxiety.  Worrying all the time.  Main stressor is anxiety trying to find a place to live.  Currently lives with father and wants to move out on her own.  Trying to find a job.  Rates anxiety a 7/10 with 10 being worse.  Anxiety is causing her to feel depressed.  Rates depression a 4/10 over the past week.l  Denies any suicidal thoughts, homicidal thoughts, or any a/V hallucinations.  Seeing a therapist at [facility] in [place]. Pt states she has noticed some cravings mainly when she is really anxious.  Easily agitated and irritable. Does not have any contact with anyone from her past that used substances.  Continues to see her other children every other weekend.            TThe following portions of the patient's history were reviewed and updated as appropriate: allergies, current medications, past family history, past medical history, past social history, past surgical history and problem list.    Review of Systems   Constitutional: Negative for activity change, appetite change and fatigue.   HENT: Negative.    Eyes: Negative for visual disturbance.   Respiratory: Negative.    Cardiovascular: Negative.    Gastrointestinal: Negative for nausea.   Endocrine: Negative.    Genitourinary: Negative.    Musculoskeletal: Negative for arthralgias.   Skin: Negative.    Allergic/Immunologic: Negative.    Neurological: Negative for dizziness, seizures and headaches.   Hematological: Negative.    Psychiatric/Behavioral: Negative for agitation, behavioral problems, confusion, decreased concentration, dysphoric mood, hallucinations, self-injury, sleep disturbance and suicidal ideas. The patient is nervous/anxious. The patient is not hyperactive.        Objective   Physical Exam  "  Constitutional: She is oriented to person, place, and time. She appears well-developed and well-nourished.   Neurological: She is alert and oriented to person, place, and time.   Psychiatric: She has a normal mood and affect. Her speech is normal and behavior is normal. Judgment and thought content normal. Cognition and memory are normal.   Nursing note and vitals reviewed.    Blood pressure 111/73, height 167.6 cm (66\"), weight 93.8 kg (206 lb 12.8 oz), unknown if currently breastfeeding.    Medication List:   Current Outpatient Medications   Medication Sig Dispense Refill   • amoxicillin (AMOXIL) 500 MG capsule      • buprenorphine-naloxone (SUBOXONE) 2-0.5 MG per SL tablet Take 3 tablets of the evening 42 tablet 0   • buprenorphine-naloxone (SUBOXONE) 8-2 MG per SL tablet Place 1 tablet under the tongue Daily. In the am 14 tablet 0   • ibuprofen (ADVIL,MOTRIN) 800 MG tablet      • oseltamivir (TAMIFLU) 75 MG capsule      • polyethylene glycol (MIRALAX) powder Mix 17 grams (1 capful) with 8 oz. water, juice, soda, coffee or tea and drink every day. 527 g 0   • escitalopram (LEXAPRO) 5 MG tablet Take 1 tablet by mouth Daily. 30 tablet 0   • hydrOXYzine (ATARAX) 25 MG tablet Take 1 tablet by mouth 3 (Three) Times a Day As Needed for Anxiety. 90 tablet 0   • ibuprofen (ADVIL,MOTRIN) 600 MG tablet Take 1 tablet by mouth Every 6 (Six) Hours As Needed for Mild Pain . 40 tablet 1   • PRENATAL 28-0.8 MG tablet Take 1 tablet by mouth Daily. 30 each 6   • Prenatal Vit-Fe Fumarate-FA (PRENATAL VITAMIN 27-0.8) 27-0.8 MG tablet tablet Take 1 tablet by mouth Daily. 30 tablet 0   • propranolol (INDERAL) 10 MG tablet Take 1 tablet by mouth 2 (Two) Times a Day. As needed for anxiety 60 tablet 0     No current facility-administered medications for this visit.        Mental Status Exam:   Hygiene:   good  Cooperation:  Cooperative  Eye Contact:  Good  Psychomotor Behavior:  Appropriate  Affect:  Full range  Hopelessness: " Denies  Speech:  Normal  Thought Process:  Goal directed  Thought Content:  Normal  Suicidal:  None  Homicidal:  None  Hallucinations:  None  Delusion:  None  Memory:  Intact  Orientation:  Person, Place, Time and Situation  Reliability:  good  Insight:  Good  Judgement:  Fair  Impulse Control:  Fair  Physical/Medical Issues:  Yes pregnancy    Assessment/Plan   Problems Addressed this Visit     None      Visit Diagnoses     Medication management    -  Primary    Relevant Orders    [facility] Drug Screen (Completed)    Opioid dependence on agonist therapy (CMS/HCC)        Relevant Medications    buprenorphine-naloxone (SUBOXONE) 8-2 MG per SL tablet    buprenorphine-naloxone (SUBOXONE) 2-0.5 MG per SL tablet    Benzodiazepine dependence in remission (CMS/HCC)        Generalized anxiety disorder        Relevant Medications    escitalopram (LEXAPRO) 5 MG tablet    propranolol (INDERAL) 10 MG tablet    hydrOXYzine (ATARAX) 25 MG tablet        Functionality: pt having moderate impairment in important areas of daily functioning.  Prognosis: Guarded dependent on medication/follow up and treatment plan compliance.  Chin reviewed.  UDS obtained today.  Past ones have been appropriate. Today UDS shows positive buprenorphine  I am initiating Lexapro low dose.  Also giving some inderal and atarax for anxiety.  She is to continue therapy at [facility] and her NA meetings.  Meetings have been scanned into epic.      Continuing efforts to promote the therapeutic alliance, address the patient's issues, and strengthen self awareness, insights, and coping skills Patient is agreeable to call the [facility].  Patient is aware to call 911 or go to the nearest ER should begin having SI/HI.  I will have her RTC in 2 weeks.

## 2019-01-14 ENCOUNTER — TELEPHONE (OUTPATIENT)
Dept: PSYCHIATRY | Facility: CLINIC | Age: 24
End: 2019-01-14

## 2019-01-14 DIAGNOSIS — F11.20 OPIOID DEPENDENCE ON AGONIST THERAPY (HCC): ICD-10-CM

## 2019-01-14 RX ORDER — BUPRENORPHINE HYDROCHLORIDE AND NALOXONE HYDROCHLORIDE DIHYDRATE 2; .5 MG/1; MG/1
TABLET SUBLINGUAL
Qty: 21 TABLET | Refills: 0 | Status: SHIPPED | OUTPATIENT
Start: 2019-01-14 | End: 2019-01-21 | Stop reason: SDUPTHER

## 2019-01-14 RX ORDER — BUPRENORPHINE HYDROCHLORIDE AND NALOXONE HYDROCHLORIDE DIHYDRATE 8; 2 MG/1; MG/1
1 TABLET SUBLINGUAL DAILY
Qty: 7 TABLET | Refills: 0 | Status: SHIPPED | OUTPATIENT
Start: 2019-01-14 | End: 2019-01-21 | Stop reason: SDUPTHER

## 2019-01-14 NOTE — TELEPHONE ENCOUNTER
Patient called the office stating the reason she missed her dave today was due to car trouble , she was requesting an dave within the week I made her aware providers scheduled was completely booked for this week and I would have to speak with her  to see if she can work patient in.

## 2019-01-14 NOTE — TELEPHONE ENCOUNTER
Called Patient back scheduled her for Monday at 4pm and made her aware that she is to bring any documents from her meeting to her dave

## 2019-01-21 ENCOUNTER — OFFICE VISIT (OUTPATIENT)
Dept: PSYCHIATRY | Facility: CLINIC | Age: 24
End: 2019-01-21

## 2019-01-21 VITALS
SYSTOLIC BLOOD PRESSURE: 134 MMHG | HEART RATE: 75 BPM | DIASTOLIC BLOOD PRESSURE: 77 MMHG | BODY MASS INDEX: 32.95 KG/M2 | HEIGHT: 66 IN | WEIGHT: 205 LBS

## 2019-01-21 DIAGNOSIS — F13.21 BENZODIAZEPINE DEPENDENCE IN REMISSION (HCC): ICD-10-CM

## 2019-01-21 DIAGNOSIS — F41.1 GENERALIZED ANXIETY DISORDER: ICD-10-CM

## 2019-01-21 DIAGNOSIS — F11.20 OPIOID DEPENDENCE ON AGONIST THERAPY (HCC): Primary | ICD-10-CM

## 2019-01-21 PROCEDURE — 99214 OFFICE O/P EST MOD 30 MIN: CPT | Performed by: NURSE PRACTITIONER

## 2019-01-21 RX ORDER — BUPRENORPHINE HYDROCHLORIDE AND NALOXONE HYDROCHLORIDE DIHYDRATE 8; 2 MG/1; MG/1
1 TABLET SUBLINGUAL DAILY
Qty: 28 TABLET | Refills: 0 | Status: ON HOLD | OUTPATIENT
Start: 2019-01-21 | End: 2019-10-11

## 2019-01-21 RX ORDER — FLUOXETINE 10 MG/1
10 CAPSULE ORAL DAILY
Qty: 30 CAPSULE | Refills: 0 | Status: SHIPPED | OUTPATIENT
Start: 2019-01-21 | End: 2019-03-20

## 2019-01-21 RX ORDER — BUPRENORPHINE HYDROCHLORIDE AND NALOXONE HYDROCHLORIDE DIHYDRATE 8; 2 MG/1; MG/1
1 TABLET SUBLINGUAL DAILY
Qty: 28 TABLET | Refills: 0 | Status: SHIPPED | OUTPATIENT
Start: 2019-01-21 | End: 2019-01-21 | Stop reason: SDUPTHER

## 2019-01-21 RX ORDER — BUPRENORPHINE HYDROCHLORIDE AND NALOXONE HYDROCHLORIDE DIHYDRATE 2; .5 MG/1; MG/1
TABLET SUBLINGUAL
Qty: 84 TABLET | Refills: 0 | Status: ON HOLD | OUTPATIENT
Start: 2019-01-21 | End: 2019-10-11

## 2019-01-21 RX ORDER — PROPRANOLOL HYDROCHLORIDE 20 MG/1
20 TABLET ORAL 2 TIMES DAILY
Qty: 60 TABLET | Refills: 0 | Status: ON HOLD | OUTPATIENT
Start: 2019-01-21 | End: 2019-10-11

## 2019-01-21 RX ORDER — BUPRENORPHINE HYDROCHLORIDE AND NALOXONE HYDROCHLORIDE DIHYDRATE 2; .5 MG/1; MG/1
TABLET SUBLINGUAL
Qty: 84 TABLET | Refills: 0 | Status: SHIPPED | OUTPATIENT
Start: 2019-01-21 | End: 2019-01-21 | Stop reason: SDUPTHER

## 2019-01-21 NOTE — PROGRESS NOTES
"      Subjective   Chinmay Thomas is a 23 y.o. female is here today for medication management follow-up . Presents to the [facility]    Chief Complaint: Recheck on MAT post partum  History of Present Illness:  Pt states she is doing \"OK\".  She is living be herself with the baby. She is still having transportation issues with the car.  She is getting ready to move in her boyfriend and states he does not have any substance use issues.  He works at [employer]. He is the father of her youngest 2 children.  She states he is support for her and that they will be getting a more dependable vehicle when he gets his taxes back.  She continues to have visitation with her older 2 children who reside in [place] with the grandparent every other weekend.  She continues to be in drug court and submitting urine samples for this.  She denies any current cravings.  Lexapro has caused her to have intolerable vivid dreams.  Has not helped with anxiety.  Cannot tell much help with the atarax or inderal either.  Denies any suicidal or homicidal thoughts, no a/ hallucinations.  Body mass index is 33.09 kg/m². Appetite good.  Sleeping at least 7 hours a night.  Mood is stable.  Still continues to have anxiety and this was worsened by going down on the suboxone.  She states she is dealing with it .  Continues to see therapist at [facility].                TThe following portions of the patient's history were reviewed and updated as appropriate: allergies, current medications, past family history, past medical history, past social history, past surgical history and problem list.    Review of Systems   Constitutional: Negative for activity change, appetite change and fatigue.   HENT: Negative.    Eyes: Negative for visual disturbance.   Respiratory: Negative.    Cardiovascular: Negative.    Gastrointestinal: Negative for nausea.   Endocrine: Negative.    Genitourinary: Negative.    Musculoskeletal: Negative for " "arthralgias.   Skin: Negative.    Allergic/Immunologic: Negative.    Neurological: Negative for dizziness, seizures and headaches.   Hematological: Negative.    Psychiatric/Behavioral: Negative for agitation, behavioral problems, confusion, decreased concentration, dysphoric mood, hallucinations, self-injury, sleep disturbance and suicidal ideas. The patient is nervous/anxious. The patient is not hyperactive.        Objective   Physical Exam   Constitutional: She is oriented to person, place, and time. She appears well-developed and well-nourished.   Neurological: She is alert and oriented to person, place, and time.   Psychiatric: She has a normal mood and affect. Her speech is normal and behavior is normal. Judgment and thought content normal. Cognition and memory are normal.   Nursing note and vitals reviewed.    Blood pressure 134/77, pulse 75, height 167.6 cm (66\"), weight 93 kg (205 lb), unknown if currently breastfeeding.    Medication List:   Current Outpatient Medications   Medication Sig Dispense Refill   • amoxicillin (AMOXIL) 500 MG capsule      • buprenorphine-naloxone (SUBOXONE) 2-0.5 MG per SL tablet Take 3 tablets of the evening 84 tablet 0   • buprenorphine-naloxone (SUBOXONE) 8-2 MG per SL tablet Place 1 tablet under the tongue Daily. In the am 28 tablet 0   • FLUoxetine (PROzac) 10 MG capsule Take 1 capsule by mouth Daily. 30 capsule 0   • hydrOXYzine (ATARAX) 25 MG tablet Take 1 tablet by mouth 3 (Three) Times a Day As Needed for Anxiety. 90 tablet 0   • ibuprofen (ADVIL,MOTRIN) 600 MG tablet Take 1 tablet by mouth Every 6 (Six) Hours As Needed for Mild Pain . 40 tablet 1   • ibuprofen (ADVIL,MOTRIN) 800 MG tablet      • oseltamivir (TAMIFLU) 75 MG capsule      • polyethylene glycol (MIRALAX) powder Mix 17 grams (1 capful) with 8 oz. water, juice, soda, coffee or tea and drink every day. 527 g 0   • PRENATAL 28-0.8 MG tablet Take 1 tablet by mouth Daily. 30 each 6   • Prenatal Vit-Fe Fumarate-FA " (PRENATAL VITAMIN 27-0.8) 27-0.8 MG tablet tablet Take 1 tablet by mouth Daily. 30 tablet 0   • propranolol (INDERAL) 20 MG tablet Take 1 tablet by mouth 2 (Two) Times a Day. As needed for anxiety 60 tablet 0     No current facility-administered medications for this visit.        Mental Status Exam:   Hygiene:   good  Cooperation:  Cooperative  Eye Contact:  Good  Psychomotor Behavior:  Appropriate  Affect:  Full range  Hopelessness: Denies  Speech:  Normal  Thought Process:  Goal directed  Thought Content:  Normal  Suicidal:  None  Homicidal:  None  Hallucinations:  None  Delusion:  None  Memory:  Intact  Orientation:  Person, Place, Time and Situation  Reliability:  good  Insight:  Good  Judgement:  Fair  Impulse Control:  Fair  Physical/Medical Issues:  Yes pregnancy    Assessment/Plan   Problems Addressed this Visit     None      Visit Diagnoses     Opioid dependence on agonist therapy (CMS/Former[facility])    -  Primary    Relevant Medications    buprenorphine-naloxone (SUBOXONE) 8-2 MG per SL tablet    buprenorphine-naloxone (SUBOXONE) 2-0.5 MG per SL tablet    Benzodiazepine dependence in remission (CMS/Formerly [facility])        Generalized anxiety disorder        Relevant Medications    FLUoxetine (PROzac) 10 MG capsule    propranolol (INDERAL) 20 MG tablet        Functionality: pt having moderate impairment in important areas of daily functioning.  Prognosis: Guarded dependent on medication/follow up and treatment plan compliance.  Chin reviewed.  UDS obtained today.  Past ones have been appropriate.    She is to continue therapy at [facility] and her NA meetings.  Meetings have been scanned into epic. She has also signed a release for us to obtain her records and office visits from [facility] in [place].  I am stopping the lexapro and letting her try low dose Prozac.  I am increasing the inderal to 20mg.  She is to continue the atarax and the suboxone at the current dosage.  We discussed plans to taper down to 8mg in the am and 4 mg  in the PM at her next visit. She is to continue using condoms and is aware of the riskd of pregnancy and the medication she is taking.  /she plans to seek tubal ligation but says issues with transportation lately have impeded this.      Continuing efforts to promote the therapeutic alliance, address the patient's issues, and strengthen self awareness, insights, and coping skills Patient is agreeable to call the [facility].  Patient is aware to call 911 or go to the nearest ER should begin having SI/HI.  I will have her RTC in 4 weeks.

## 2019-01-28 ENCOUNTER — TELEPHONE (OUTPATIENT)
Dept: PSYCHIATRY | Facility: CLINIC | Age: 24
End: 2019-01-28

## 2019-02-04 NOTE — TELEPHONE ENCOUNTER
I tried to call the patient again today number listed go straight to a [place] voicemail recording

## 2019-02-05 ENCOUNTER — TELEPHONE (OUTPATIENT)
Dept: PSYCHIATRY | Facility: CLINIC | Age: 24
End: 2019-02-05

## 2019-02-05 NOTE — TELEPHONE ENCOUNTER
Patient Finally called the office after several days of trying to reach her , Spoke with Patsy and she wanted me to make Patient aware that she is to come into the office for a Random UDS on Thursday , Patient agreed she would be here .    Patient also stated that she is needing a PA on her Suboxone.

## 2019-02-07 NOTE — TELEPHONE ENCOUNTER
Call patient and tell her we are working on the PA.  In the meantime she will have to pay for her suboxone.  If she cannot afford and she has symptoms of withdrawals she needs to go to the ER.  She needs to come in and see me next week as I need to discuss issues of noncompliance with her and establish a treatment plan.  She needs to bring me documentation of any NA meetings and documentation of counseling meetings.

## 2019-02-07 NOTE — TELEPHONE ENCOUNTER
I have tried to call patient at number listed in her chart and there is no answer and no voicemail to leave a message

## 2019-02-07 NOTE — TELEPHONE ENCOUNTER
Patient called the office wondering if her Pa has been approved for her Suboxone, I ask patient if she was still coming in to today for her UDS she states Yea she was waiting on her mother to come give her a ride

## 2019-02-07 NOTE — TELEPHONE ENCOUNTER
I called the  Pharmacy per providers request , Pharmacy states Patient picked her suboxone up on 1-22 but was only give a 2 week supply due to the Insurance , so patient is needing a PA

## 2019-02-08 NOTE — TELEPHONE ENCOUNTER
I finally got to speak with Patient I made aware that while we are awaiting on her PA , she will need to pay for her suboxone and if she starts to have symptoms of withdrawals she needs to go to the ER , Patient verbally understood,   Patient states the reason she didn't make it yesterday to the office for her UDS , her only means for transportation  is her mother and she never came and got her yesterday .  I made patient aware that you want her to make an dave so we scheduled her for 2-12 at 12:20

## 2019-02-19 ENCOUNTER — TELEPHONE (OUTPATIENT)
Dept: PSYCHIATRY | Facility: CLINIC | Age: 24
End: 2019-02-19

## 2019-02-19 NOTE — TELEPHONE ENCOUNTER
Patient had an dave with Patsy Holbrook on 2-18-19, I tried to call Patient the day before to confirm appointment patients number listed in chart rung once and gave me a [place]iny[place] recording , this is patient 2nd no show follow up visit on her suboxone

## 2019-03-20 ENCOUNTER — OFFICE VISIT (OUTPATIENT)
Dept: PSYCHIATRY | Facility: CLINIC | Age: 24
End: 2019-03-20

## 2019-03-20 VITALS
DIASTOLIC BLOOD PRESSURE: 76 MMHG | BODY MASS INDEX: 33.27 KG/M2 | WEIGHT: 207 LBS | HEART RATE: 106 BPM | SYSTOLIC BLOOD PRESSURE: 111 MMHG | HEIGHT: 66 IN

## 2019-03-20 DIAGNOSIS — F33.1 MAJOR DEPRESSIVE DISORDER, RECURRENT EPISODE, MODERATE (HCC): ICD-10-CM

## 2019-03-20 DIAGNOSIS — F13.20 MODERATE BENZODIAZEPINE USE DISORDER (HCC): ICD-10-CM

## 2019-03-20 DIAGNOSIS — F11.90 OPIOID USE DISORDER: Primary | ICD-10-CM

## 2019-03-20 PROCEDURE — 99214 OFFICE O/P EST MOD 30 MIN: CPT | Performed by: NURSE PRACTITIONER

## 2019-03-20 NOTE — PROGRESS NOTES
"      Subjective   Chinmay Thomas is a 23 y.o. female is here today for medication management follow-up .    Chief Complaint: suboxone treatment and depression       History of Present Illness:Pt states she had to go to  out of state and missed appointment.  States she was unable to get here.  She states she has just found a ride here. She has been buying suboxone off the streets she states she has been doing 2 strips a day.  Also she has used Klonopin off the streets. She has a history of benzodiazepine use disorder in past. She has also been off the Prozac. She states Prozac never helped anyway. Pt currently rates depression a 9/10 with 10 being worse.  Rates anxiety a 9/10.  Denies any suicidal thoughts, homicidal thoughts, or any a/V hallucinations. Body mass index is 33.41 kg/m². no appetite changes.  Sleeping well at least 8 hours a night.  She is sleeping some during the day. She states she is ready to get back on her meds.  I then explained to her that I cannot continue to prescribe suboxone for her as she has totally been noncompliant with the program.  I then went through att the documentation of calling her to reschedule etc.  She became rather hostile and said \"Then why did I come to this appointment?\" I told her I would be happy to treat her depression.  I offered her inpatient detox or long term rehab to come off the suboxone strips.  She declined.  She then stated she would find another clinic.  I then told her again I would be happy to help with her depression and she declined.  As she left the waiting room she called me a \"Piece of __it\" according to the  sam lorenzo.            TThe following portions of the patient's history were reviewed and updated as appropriate: allergies, current medications, past family history, past medical history, past social history, past surgical history and problem list.    Review of Systems   Constitutional: Negative for activity change, " "appetite change and fatigue.   HENT: Negative.    Eyes: Negative for visual disturbance.   Respiratory: Negative.    Cardiovascular: Negative.    Gastrointestinal: Negative for nausea.   Endocrine: Negative.    Genitourinary: Negative.    Musculoskeletal: Negative for arthralgias.   Skin: Negative.    Allergic/Immunologic: Negative.    Neurological: Negative for dizziness, seizures and headaches.   Hematological: Negative.    Psychiatric/Behavioral: Negative for agitation, behavioral problems, confusion, decreased concentration, dysphoric mood, hallucinations, self-injury, sleep disturbance and suicidal ideas. The patient is nervous/anxious. The patient is not hyperactive.        Objective   Physical Exam   Constitutional: She is oriented to person, place, and time. She appears well-developed and well-nourished.   Neurological: She is alert and oriented to person, place, and time.   Psychiatric: Her affect is angry. Her speech is slurred. She is agitated and slowed.   Pt was sleeping in waiting room according to Virgen lorenzo .  She was slowed and slurring at visit.    Nursing note and vitals reviewed.    Blood pressure 111/76, pulse 106, height 167.6 cm (66\"), weight 93.9 kg (207 lb), unknown if currently breastfeeding.    Medication List:   Current Outpatient Medications   Medication Sig Dispense Refill   • amoxicillin (AMOXIL) 500 MG capsule      • buprenorphine-naloxone (SUBOXONE) 2-0.5 MG per SL tablet Take 3 tablets of the evening 84 tablet 0   • buprenorphine-naloxone (SUBOXONE) 8-2 MG per SL tablet Place 1 tablet under the tongue Daily. In the am 28 tablet 0   • hydrOXYzine (ATARAX) 25 MG tablet Take 1 tablet by mouth 3 (Three) Times a Day As Needed for Anxiety. 90 tablet 0   • ibuprofen (ADVIL,MOTRIN) 600 MG tablet Take 1 tablet by mouth Every 6 (Six) Hours As Needed for Mild Pain . 40 tablet 1   • ibuprofen (ADVIL,MOTRIN) 800 MG tablet      • oseltamivir (TAMIFLU) 75 MG capsule      • " polyethylene glycol (MIRALAX) powder Mix 17 grams (1 capful) with 8 oz. water, juice, soda, coffee or tea and drink every day. 527 g 0   • PRENATAL 28-0.8 MG tablet Take 1 tablet by mouth Daily. 30 each 6   • Prenatal Vit-Fe Fumarate-FA (PRENATAL VITAMIN 27-0.8) 27-0.8 MG tablet tablet Take 1 tablet by mouth Daily. 30 tablet 0   • propranolol (INDERAL) 20 MG tablet Take 1 tablet by mouth 2 (Two) Times a Day. As needed for anxiety 60 tablet 0     No current facility-administered medications for this visit.        Mental Status Exam:   Hygiene:   good  Cooperation:  Guarded  Eye Contact:  Good  Psychomotor Behavior:  Slow  Affect:  Angry  Hopelessness: Denies  Speech:  slurred  Thought Process:  Unable to demonstrate  Thought Content:  Unable to demonstrate  Suicidal:  None  Homicidal:  None  Hallucinations:  None  Delusion:  None  Memory:  Intact  Orientation:  Person, Place, Time and Situation  Reliability:  poor  Insight:  Poor  Judgement:  Poor  Impulse Control:  Poor  Physical/Medical Issues:  No     Assessment/Plan   Problems Addressed this Visit     None      Visit Diagnoses     Opioid use disorder (CMS/HCC)    -  Primary    Moderate benzodiazepine use disorder (CMS/HCC)        Major depressive disorder, recurrent episode, moderate (CMS/HCC)            Functionality: pt having moderate impairment in important areas of daily functioning.  Prognosis: Guarded dependent on medication/follow up and treatment plan compliance.  Chin reviewed.  UDS obtained today.     See above.  Pt was told to come back to see me should she change her mind about anything regarding help with opioid use or depression.  She is to go to the ER for any problems with withdrawal or worsening depression, Suicidal thoughts, or homicidal thoughts.

## 2019-05-15 LAB
EXTERNAL ABO GROUPING: NORMAL
EXTERNAL ANTIBODY SCREEN: NEGATIVE
EXTERNAL CHLAMYDIA SCREEN: NEGATIVE
EXTERNAL GONORRHEA SCREEN: NEGATIVE
EXTERNAL HEPATITIS B SURFACE ANTIGEN: NEGATIVE
EXTERNAL HEPATITIS C AB: NORMAL
EXTERNAL RH FACTOR: POSITIVE
EXTERNAL RUBELLA QUALITATIVE: NORMAL
EXTERNAL SYPHILIS RPR SCREEN: NORMAL
HIV1 P24 AG SERPL QL IA: NORMAL

## 2019-09-20 LAB — EXTERNAL GROUP B STREP ANTIGEN: NEGATIVE

## 2019-10-11 ENCOUNTER — ANESTHESIA EVENT (OUTPATIENT)
Dept: LABOR AND DELIVERY | Facility: HOSPITAL | Age: 24
End: 2019-10-11

## 2019-10-11 ENCOUNTER — ANESTHESIA (OUTPATIENT)
Dept: LABOR AND DELIVERY | Facility: HOSPITAL | Age: 24
End: 2019-10-11

## 2019-10-11 ENCOUNTER — HOSPITAL ENCOUNTER (INPATIENT)
Facility: HOSPITAL | Age: 24
LOS: 2 days | Discharge: HOME | End: 2019-10-13
Attending: OBSTETRICS & GYNECOLOGY | Admitting: OBSTETRICS & GYNECOLOGY

## 2019-10-11 PROBLEM — Z34.90 PREGNANT: Status: ACTIVE | Noted: 2019-10-11

## 2019-10-11 PROBLEM — Z34.90 PREGNANT: Status: RESOLVED | Noted: 2019-10-11 | Resolved: 2019-10-11

## 2019-10-11 LAB
ABO GROUP BLD: NORMAL
BASOPHILS # BLD AUTO: 0.01 10*3/MM3 (ref 0–0.2)
BASOPHILS NFR BLD AUTO: 0.1 % (ref 0–1.5)
BLD GP AB SCN SERPL QL: NEGATIVE
DEPRECATED RDW RBC AUTO: 43.1 FL (ref 37–54)
EOSINOPHIL # BLD AUTO: 0.11 10*3/MM3 (ref 0–0.4)
EOSINOPHIL NFR BLD AUTO: 1 % (ref 0.3–6.2)
ERYTHROCYTE [DISTWIDTH] IN BLOOD BY AUTOMATED COUNT: 13.8 % (ref 12.3–15.4)
HCT VFR BLD AUTO: 37 % (ref 34–46.6)
HGB BLD-MCNC: 12.3 G/DL (ref 12–15.9)
IMM GRANULOCYTES # BLD AUTO: 0.15 10*3/MM3 (ref 0–0.05)
IMM GRANULOCYTES NFR BLD AUTO: 1.4 % (ref 0–0.5)
LYMPHOCYTES # BLD AUTO: 3.51 10*3/MM3 (ref 0.7–3.1)
LYMPHOCYTES NFR BLD AUTO: 32.4 % (ref 19.6–45.3)
MCH RBC QN AUTO: 28.7 PG (ref 26.6–33)
MCHC RBC AUTO-ENTMCNC: 33.2 G/DL (ref 31.5–35.7)
MCV RBC AUTO: 86.2 FL (ref 79–97)
MONOCYTES # BLD AUTO: 0.84 10*3/MM3 (ref 0.1–0.9)
MONOCYTES NFR BLD AUTO: 7.7 % (ref 5–12)
NEUTROPHILS # BLD AUTO: 6.23 10*3/MM3 (ref 1.7–7)
NEUTROPHILS NFR BLD AUTO: 57.4 % (ref 42.7–76)
PLATELET # BLD AUTO: 291 10*3/MM3 (ref 140–450)
PMV BLD AUTO: 10.3 FL (ref 6–12)
RBC # BLD AUTO: 4.29 10*6/MM3 (ref 3.77–5.28)
RH BLD: POSITIVE
T&S EXPIRATION DATE: NORMAL
WBC NRBC COR # BLD: 10.85 10*3/MM3 (ref 3.4–10.8)

## 2019-10-11 PROCEDURE — 86850 RBC ANTIBODY SCREEN: CPT | Performed by: OBSTETRICS & GYNECOLOGY

## 2019-10-11 PROCEDURE — 86900 BLOOD TYPING SEROLOGIC ABO: CPT | Performed by: OBSTETRICS & GYNECOLOGY

## 2019-10-11 PROCEDURE — 36410 VNPNXR 3YR/> PHY/QHP DX/THER: CPT

## 2019-10-11 PROCEDURE — 88307 TISSUE EXAM BY PATHOLOGIST: CPT | Performed by: OBSTETRICS & GYNECOLOGY

## 2019-10-11 PROCEDURE — C1755 CATHETER, INTRASPINAL: HCPCS

## 2019-10-11 PROCEDURE — 85025 COMPLETE CBC W/AUTO DIFF WBC: CPT | Performed by: OBSTETRICS & GYNECOLOGY

## 2019-10-11 PROCEDURE — C1751 CATH, INF, PER/CENT/MIDLINE: HCPCS

## 2019-10-11 PROCEDURE — 59025 FETAL NON-STRESS TEST: CPT

## 2019-10-11 PROCEDURE — 25010000002 FENTANYL CITRATE (PF) 100 MCG/2ML SOLUTION: Performed by: ANESTHESIOLOGY

## 2019-10-11 PROCEDURE — 86901 BLOOD TYPING SEROLOGIC RH(D): CPT | Performed by: OBSTETRICS & GYNECOLOGY

## 2019-10-11 PROCEDURE — C1755 CATHETER, INTRASPINAL: HCPCS | Performed by: ANESTHESIOLOGY

## 2019-10-11 RX ORDER — ROPIVACAINE HYDROCHLORIDE 2 MG/ML
14 INJECTION, SOLUTION EPIDURAL; INFILTRATION; PERINEURAL CONTINUOUS
Status: DISCONTINUED | OUTPATIENT
Start: 2019-10-11 | End: 2019-10-11

## 2019-10-11 RX ORDER — FAMOTIDINE 10 MG/ML
20 INJECTION, SOLUTION INTRAVENOUS ONCE AS NEEDED
Status: DISCONTINUED | OUTPATIENT
Start: 2019-10-11 | End: 2019-10-11 | Stop reason: HOSPADM

## 2019-10-11 RX ORDER — METHYLERGONOVINE MALEATE 0.2 MG/ML
200 INJECTION INTRAVENOUS ONCE AS NEEDED
Status: DISCONTINUED | OUTPATIENT
Start: 2019-10-11 | End: 2019-10-11 | Stop reason: HOSPADM

## 2019-10-11 RX ORDER — ACETAMINOPHEN 325 MG/1
650 TABLET ORAL EVERY 4 HOURS PRN
Status: DISCONTINUED | OUTPATIENT
Start: 2019-10-11 | End: 2019-10-11 | Stop reason: HOSPADM

## 2019-10-11 RX ORDER — DIPHENHYDRAMINE HCL 25 MG
25 CAPSULE ORAL NIGHTLY PRN
Status: DISCONTINUED | OUTPATIENT
Start: 2019-10-11 | End: 2019-10-13 | Stop reason: HOSPADM

## 2019-10-11 RX ORDER — ONDANSETRON 2 MG/ML
4 INJECTION INTRAMUSCULAR; INTRAVENOUS EVERY 6 HOURS PRN
Status: DISCONTINUED | OUTPATIENT
Start: 2019-10-11 | End: 2019-10-11 | Stop reason: HOSPADM

## 2019-10-11 RX ORDER — FENTANYL CITRATE 50 UG/ML
INJECTION, SOLUTION INTRAMUSCULAR; INTRAVENOUS
Status: COMPLETED
Start: 2019-10-11 | End: 2019-10-11

## 2019-10-11 RX ORDER — LIDOCAINE HYDROCHLORIDE 20 MG/ML
INJECTION, SOLUTION EPIDURAL; INFILTRATION; INTRACAUDAL; PERINEURAL AS NEEDED
Status: DISCONTINUED | OUTPATIENT
Start: 2019-10-11 | End: 2019-10-11 | Stop reason: SURG

## 2019-10-11 RX ORDER — FENTANYL CITRATE 50 UG/ML
INJECTION, SOLUTION INTRAMUSCULAR; INTRAVENOUS AS NEEDED
Status: DISCONTINUED | OUTPATIENT
Start: 2019-10-11 | End: 2019-10-11 | Stop reason: SURG

## 2019-10-11 RX ORDER — PRENATAL VIT/IRON FUM/FOLIC AC 27MG-0.8MG
1 TABLET ORAL DAILY
Status: DISCONTINUED | OUTPATIENT
Start: 2019-10-11 | End: 2019-10-13 | Stop reason: HOSPADM

## 2019-10-11 RX ORDER — ACETAMINOPHEN 325 MG/1
TABLET ORAL
Status: COMPLETED
Start: 2019-10-11 | End: 2019-10-11

## 2019-10-11 RX ORDER — SODIUM CHLORIDE, SODIUM LACTATE, POTASSIUM CHLORIDE, CALCIUM CHLORIDE 600; 310; 30; 20 MG/100ML; MG/100ML; MG/100ML; MG/100ML
125 INJECTION, SOLUTION INTRAVENOUS CONTINUOUS
Status: DISCONTINUED | OUTPATIENT
Start: 2019-10-11 | End: 2019-10-11

## 2019-10-11 RX ORDER — ONDANSETRON 2 MG/ML
4 INJECTION INTRAMUSCULAR; INTRAVENOUS ONCE AS NEEDED
Status: DISCONTINUED | OUTPATIENT
Start: 2019-10-11 | End: 2019-10-11 | Stop reason: HOSPADM

## 2019-10-11 RX ORDER — LIDOCAINE HYDROCHLORIDE 20 MG/ML
INJECTION, SOLUTION EPIDURAL; INFILTRATION; INTRACAUDAL; PERINEURAL
Status: COMPLETED
Start: 2019-10-11 | End: 2019-10-11

## 2019-10-11 RX ORDER — IBUPROFEN 800 MG/1
800 TABLET ORAL 3 TIMES DAILY
Status: DISCONTINUED | OUTPATIENT
Start: 2019-10-11 | End: 2019-10-13 | Stop reason: HOSPADM

## 2019-10-11 RX ORDER — BUPRENORPHINE 2 MG/1
16 TABLET SUBLINGUAL DAILY
Status: CANCELLED | OUTPATIENT
Start: 2019-10-11

## 2019-10-11 RX ORDER — FENTANYL CIT 0.2 MG/100ML-ROPIV 0.2%-NACL 0.9% EPIDURAL INJ 2/0.2 MCG/ML-%
SOLUTION INJECTION CONTINUOUS PRN
Status: DISCONTINUED | OUTPATIENT
Start: 2019-10-11 | End: 2019-10-11 | Stop reason: SURG

## 2019-10-11 RX ORDER — OXYTOCIN-SODIUM CHLORIDE 0.9% IV SOLN 30 UNIT/500ML 30-0.9/5 UT/ML-%
125 SOLUTION INTRAVENOUS CONTINUOUS PRN
Status: DISCONTINUED | OUTPATIENT
Start: 2019-10-11 | End: 2019-10-11 | Stop reason: HOSPADM

## 2019-10-11 RX ORDER — OXYTOCIN-SODIUM CHLORIDE 0.9% IV SOLN 30 UNIT/500ML 30-0.9/5 UT/ML-%
999 SOLUTION INTRAVENOUS ONCE
Status: DISCONTINUED | OUTPATIENT
Start: 2019-10-11 | End: 2019-10-11 | Stop reason: HOSPADM

## 2019-10-11 RX ORDER — MISOPROSTOL 100 UG/1
600 TABLET ORAL AS NEEDED
Status: DISCONTINUED | OUTPATIENT
Start: 2019-10-11 | End: 2019-10-11 | Stop reason: HOSPADM

## 2019-10-11 RX ORDER — SODIUM CHLORIDE 0.9 % (FLUSH) 0.9 %
3-10 SYRINGE (ML) INJECTION AS NEEDED
Status: DISCONTINUED | OUTPATIENT
Start: 2019-10-11 | End: 2019-10-11 | Stop reason: HOSPADM

## 2019-10-11 RX ORDER — FENTANYL CIT 0.2 MG/100ML-ROPIV 0.2%-NACL 0.9% EPIDURAL INJ 2/0.2 MCG/ML-%
SOLUTION INJECTION
Status: COMPLETED
Start: 2019-10-11 | End: 2019-10-11

## 2019-10-11 RX ORDER — OXYTOCIN-SODIUM CHLORIDE 0.9% IV SOLN 30 UNIT/500ML 30-0.9/5 UT/ML-%
250 SOLUTION INTRAVENOUS CONTINUOUS
Status: ACTIVE | OUTPATIENT
Start: 2019-10-11 | End: 2019-10-11

## 2019-10-11 RX ORDER — ONDANSETRON 4 MG/1
4 TABLET, FILM COATED ORAL EVERY 6 HOURS PRN
Status: DISCONTINUED | OUTPATIENT
Start: 2019-10-11 | End: 2019-10-11 | Stop reason: HOSPADM

## 2019-10-11 RX ORDER — BUPRENORPHINE HYDROCHLORIDE 8 MG/1
16 TABLET SUBLINGUAL DAILY
COMMUNITY

## 2019-10-11 RX ORDER — OXYTOCIN-SODIUM CHLORIDE 0.9% IV SOLN 30 UNIT/500ML 30-0.9/5 UT/ML-%
2 SOLUTION INTRAVENOUS
Status: DISCONTINUED | OUTPATIENT
Start: 2019-10-11 | End: 2019-10-11 | Stop reason: HOSPADM

## 2019-10-11 RX ORDER — IBUPROFEN 800 MG/1
800 TABLET ORAL EVERY 6 HOURS PRN
Status: DISCONTINUED | OUTPATIENT
Start: 2019-10-11 | End: 2019-10-11 | Stop reason: HOSPADM

## 2019-10-11 RX ORDER — PRENATAL VIT/IRON FUM/FOLIC AC 27MG-0.8MG
1 TABLET ORAL DAILY
Status: CANCELLED | OUTPATIENT
Start: 2019-10-12

## 2019-10-11 RX ORDER — ONDANSETRON 4 MG/1
4 TABLET, FILM COATED ORAL EVERY 8 HOURS PRN
Status: DISCONTINUED | OUTPATIENT
Start: 2019-10-11 | End: 2019-10-13 | Stop reason: HOSPADM

## 2019-10-11 RX ORDER — BISACODYL 10 MG
10 SUPPOSITORY, RECTAL RECTAL DAILY PRN
Status: DISCONTINUED | OUTPATIENT
Start: 2019-10-12 | End: 2019-10-13 | Stop reason: HOSPADM

## 2019-10-11 RX ORDER — EPHEDRINE SULFATE 50 MG/ML
10 INJECTION, SOLUTION INTRAVENOUS
Status: DISCONTINUED | OUTPATIENT
Start: 2019-10-11 | End: 2019-10-11 | Stop reason: HOSPADM

## 2019-10-11 RX ORDER — SODIUM CHLORIDE 0.9 % (FLUSH) 0.9 %
1-10 SYRINGE (ML) INJECTION AS NEEDED
Status: DISCONTINUED | OUTPATIENT
Start: 2019-10-11 | End: 2019-10-13 | Stop reason: HOSPADM

## 2019-10-11 RX ORDER — BUPRENORPHINE 2 MG/1
4 TABLET SUBLINGUAL DAILY
Status: DISCONTINUED | OUTPATIENT
Start: 2019-10-11 | End: 2019-10-13 | Stop reason: HOSPADM

## 2019-10-11 RX ORDER — DOCUSATE SODIUM 100 MG/1
100 CAPSULE, LIQUID FILLED ORAL 2 TIMES DAILY PRN
Status: DISCONTINUED | OUTPATIENT
Start: 2019-10-11 | End: 2019-10-13 | Stop reason: HOSPADM

## 2019-10-11 RX ORDER — CARBOPROST TROMETHAMINE 250 UG/ML
250 INJECTION, SOLUTION INTRAMUSCULAR AS NEEDED
Status: DISCONTINUED | OUTPATIENT
Start: 2019-10-11 | End: 2019-10-11 | Stop reason: HOSPADM

## 2019-10-11 RX ORDER — BUTORPHANOL TARTRATE 1 MG/ML
1 INJECTION, SOLUTION INTRAMUSCULAR; INTRAVENOUS
Status: DISCONTINUED | OUTPATIENT
Start: 2019-10-11 | End: 2019-10-11 | Stop reason: HOSPADM

## 2019-10-11 RX ORDER — LIDOCAINE HYDROCHLORIDE AND EPINEPHRINE 15; 5 MG/ML; UG/ML
INJECTION, SOLUTION EPIDURAL AS NEEDED
Status: DISCONTINUED | OUTPATIENT
Start: 2019-10-11 | End: 2019-10-11 | Stop reason: SURG

## 2019-10-11 RX ADMIN — SODIUM CHLORIDE, POTASSIUM CHLORIDE, SODIUM LACTATE AND CALCIUM CHLORIDE 125 ML/HR: 600; 310; 30; 20 INJECTION, SOLUTION INTRAVENOUS at 10:09

## 2019-10-11 RX ADMIN — HYDROCORTISONE 2.5% 1 APPLICATION: 25 CREAM TOPICAL at 18:23

## 2019-10-11 RX ADMIN — Medication 0.2 MG: at 10:10

## 2019-10-11 RX ADMIN — SODIUM CHLORIDE, POTASSIUM CHLORIDE, SODIUM LACTATE AND CALCIUM CHLORIDE 1000 ML: 600; 310; 30; 20 INJECTION, SOLUTION INTRAVENOUS at 09:13

## 2019-10-11 RX ADMIN — BUPRENORPHINE HCL 4 MG: 2 TABLET SUBLINGUAL at 18:23

## 2019-10-11 RX ADMIN — PRENATAL VIT W/ FE FUMARATE-FA TAB 27-0.8 MG 1 TABLET: 27-0.8 TAB at 18:23

## 2019-10-11 RX ADMIN — WITCH HAZEL 1 PAD: 500 SOLUTION RECTAL; TOPICAL at 18:23

## 2019-10-11 RX ADMIN — FENTANYL CITRATE 100 MCG: 50 INJECTION, SOLUTION INTRAMUSCULAR; INTRAVENOUS at 10:06

## 2019-10-11 RX ADMIN — LIDOCAINE HYDROCHLORIDE 8 ML: 20 INJECTION, SOLUTION EPIDURAL; INFILTRATION; INTRACAUDAL; PERINEURAL at 10:06

## 2019-10-11 RX ADMIN — OXYTOCIN 2 MILLI-UNITS/MIN: 10 INJECTION INTRAVENOUS at 10:09

## 2019-10-11 RX ADMIN — BENZOCAINE 1 APPLICATION: 5.6 OINTMENT TOPICAL at 18:23

## 2019-10-11 RX ADMIN — ACETAMINOPHEN 650 MG: 325 TABLET ORAL at 22:00

## 2019-10-11 RX ADMIN — FENTANYL CIT 0.2 MG/100ML-ROPIV 0.2%-NACL 0.9% EPIDURAL INJ 12 ML/HR: 2/0.2 SOLUTION at 10:07

## 2019-10-11 RX ADMIN — LIDOCAINE HYDROCHLORIDE AND EPINEPHRINE 3 ML: 15; 5 INJECTION, SOLUTION EPIDURAL at 10:01

## 2019-10-11 NOTE — L&D DELIVERY NOTE
[facility]  Vaginal Delivery Note    Delivery     Delivery:    by Breanna Becker    YOB: 2020    Time of Birth: 1:53 PM      Anesthesia: Epidural     Delivering clinician: Mona Bautista    Forceps?   No   Vacuum? No    Shoulder dystocia present: No        Delivery narrative:              Pt delivered precipitously in bed by Breanna Becker.  I arrived to find infant was placed to mother's chest.  Cord was doubly clamped and cut by FOB.  Cord blood was obtained by me.  Placenta was delivered spontaneously.  Uterus was firm with fundal massage.        Infant    Findings: male  infant     Infant observations: Weight: No birth weight on file.   Length:    in  Observations/Comments:         Apgars:   8 @ 1 minute /      9 @ 5 minutes   Infant Name:      Placenta, Cord, and Fluid    Placenta delivered  Spontaneous  at  10/11/2019  1:58 PM     Cord:    present.   Nuchal Cord?  no   Cord blood obtained:                     Repair    Episiotomy: None    Lacerations: No   Estimated Blood Loss:   300 mls.   Suture used for repair:      Complications  precipitous delivery, IUGR    Disposition  Mother to postpartum in stable condition.    Mona Bautista DO  10/11/19  2:09 PM

## 2019-10-11 NOTE — ANESTHESIA PREPROCEDURE EVALUATION
Anesthesia Evaluation     Patient summary reviewed and Nursing notes reviewed   NPO Solid Status: > 8 hours  NPO Liquid Status: > 8 hours           Airway   Mallampati: II  TM distance: >3 FB  Neck ROM: full  No difficulty expected  Dental    (+) poor dentition    Pulmonary - normal exam   (+) a smoker Current Smoked day of surgery,   Cardiovascular - negative cardio ROS and normal exam        Neuro/Psych- negative ROS  GI/Hepatic/Renal/Endo    (+)   hepatitis C, liver disease,     Musculoskeletal (-) negative ROS    Abdominal  - normal exam   Substance History - negative use      Comment: Hx opioid dependence   OB/GYN    (+) Pregnant,         Other - negative ROS                         Anesthesia Plan    ASA 2     epidural     Anesthetic plan, all risks, benefits, and alternatives have been provided, discussed and informed consent has been obtained with: patient.

## 2019-10-11 NOTE — H&P
ROSA Cortes  Obstetric History and Physical    Chief Complaint   Patient presents with   • Contractions     38 weeks from [place], states was to be induced today.       Subjective     Patient is a 24 y.o. female  currently at 38w0d, who presents with contractions.  She was scheduled for IOL today secondary to severe IUGR 2% in office at [place], [place].  Pt states she does not want to be delivered there because her other children were delivered here.    Her prenatal care is complicated by  substance abuse  narcotics maintenance program, hypertension  gestational hypertension and abnormal fetal growth  IUGR.  Her previous obstetric/gynecological history is noted for is non-contributory.    The following portions of the patients history were reviewed and updated as appropriate: current medications, allergies, past medical history, past surgical history, past family history, past social history and problem list .       Prenatal Information:   Maternal Prenatal Labs  Blood Type No results found for: ABO   Rh Status No results found for: RH   Antibody Screen No results found for: ABSCRN   Rapid Urine Drug Screen No results found for: AMPMETHU, BARBITSCNUR, LABBENZSCN, LABMETHSCN, LABOPIASCN, THCURSCR, COCAINEUR, AMPHETSCREEN, PROPOXSCN, BUPRENORSCNU, METAMPSCNUR, OXYCODONESCN, TRICYCLICSCN   Group B Strep Culture No results found for: GBSANTIGEN           External Prenatal Results     Pregnancy Outside Results - Transcribed From Office Records - See Scanned Records For Details     Test Value Date Time    Hgb 12.9 g/dL 10/10/18 0650    Hct 37.4 % 10/10/18 0650    ABO O  05/15/19     Rh Positive  05/15/19     Antibody Screen Negative  05/15/19     Glucose Fasting GTT       Glucose Tolerance Test 1 hour       Glucose Tolerance Test 3 hour       Gonorrhea (discrete) Negative  05/15/19     Chlamydia (discrete) Negative  05/15/19     RPR Non-Reactive  05/15/19     VDRL       Syphilis Antibody       Rubella Immune  05/15/19      HBsAg Negative  05/15/19     Herpes Simplex Virus PCR       Herpes Simplex VIrus Culture       HIV Non-Reactive  05/15/19     Hep C RNA Quant PCR       Hep C Antibody pos  05/15/19     AFP       Group B Strep Negative  19     GBS Susceptibility to Clindamycin       GBS Susceptibility to Erythromycin       Fetal Fibronectin       Genetic Testing, Maternal Blood             Drug Screening     Test Value Date Time    Urine Drug Screen       Amphetamine Screen Negative  18 1012    Barbiturate Screen Negative  10/07/18 2248    Benzodiazepine Screen Negative  18 1012    Methadone Screen Negative  18 1012    Phencyclidine Screen Negative  10/07/18 2248    Opiates Screen Negative  18 1012    THC Screen Negative  18 1012    Cocaine Screen Negative  18 1012    Propoxyphene Screen       Buprenorphine Screen Positive  18 1012    Methamphetamine Screen Negative  18 1012    Oxycodone Screen Negative  18 1012    Tricyclic Antidepressants Screen                     Past OB History:     Obstetric History       T4      L4     SAB0   TAB0   Ectopic0   Molar0   Multiple0   Live Births4       # Outcome Date GA Lbr Wilmer/2nd Weight Sex Delivery Anes PTL Lv   5 Current            4 Term 10/08/18 39w0d 03:22 / 00:04 3334 g (7 lb 5.6 oz) F Vag-Spont EPI N BATSHEVA      Name: AILYN BAILEY      Apgar1:  9                Apgar5: 9   3 Term 17 40w0d  3402 g (7 lb 8 oz) F Vag-Spont None N BATSHEVA   2 Term 09/04/15 40w0d  3345 g (7 lb 6 oz) M Vag-Spont EPI N BATSHEVA   1 Term 11 40w0d  3232 g (7 lb 2 oz) F Vag-Spont EPI N BATSHEVA          Past Medical History: Past Medical History:   Diagnosis Date   • Hepatitis C    • Substance abuse (CMS/HCC)       Past Surgical History Past Surgical History:   Procedure Laterality Date   • EAR TUBES Bilateral 84   • EAR TUBES     • MOUTH SURGERY        Family History: Family History   Problem Relation Age of Onset   • Bipolar  disorder Mother    • Hypertension Father    • Kidney cancer Paternal Grandmother    • Diabetes Maternal Grandfather    • ADD / ADHD Neg Hx    • Suicide Attempts Neg Hx    • Self-Injurious Behavior  Neg Hx    • Alcohol abuse Neg Hx       Social History:  reports that she has been smoking.  She started smoking about 11 years ago. She has a 10.00 pack-year smoking history. She uses smokeless tobacco.   reports that she does not drink alcohol.   reports that she uses drugs. Drugs: Benzodiazepines and Other.        Review of Systems      Objective     Vital Signs Range for the last 24 hours  Temperature:     Temp Source:     BP: BP: (120)/(72) 120/72   Pulse: Heart Rate:  [87] 87   Respirations:     Weight:       Physical Examination: General appearance - alert, well appearing, and in no distress  Chest - clear to auscultation, no wheezes, rales or rhonchi, symmetric air entry  Heart - normal rate and regular rhythm  Abdomen - soft, nontender, nondistended, no masses or organomegaly  Extremities - no pedal edema noted    Presentation: cephalic   Cervix: Exam by:  Dr Bautista   Dilation:  5   Effacement:  80   Station:  -3         Assessment/Plan       Pregnant      Assessment & Plan    Assessment:  1.  Intrauterine pregnancy at 38w0d weeks gestation with reactive fetal status.    2. Severe IUGR- Will alert NICU.  CEFM.  Reactive NST.   3. Substance Abuse DO- Pt states she is prescribed 16mg Subutex daily but that she tries to only take 4mg daily.    4. GBS negative.   5. Labor- pt has advance dilation.  Will keep and augment as needed.     Mona Bautista DO  10/11/2019  8:17 AM

## 2019-10-11 NOTE — ANESTHESIA PROCEDURE NOTES
Labor Epidural    Pre-sedation assessment completed: 10/11/2019 9:40 AM    Patient reassessed immediately prior to procedure    Patient location during procedure: OB  Start Time: 10/11/2019 9:40 AM  Indication:at surgeon's request  Performed By  Anesthesiologist: Leif Armendariz MD  Preanesthetic Checklist  Completed: patient identified, site marked, surgical consent, pre-op evaluation, timeout performed, IV checked, risks and benefits discussed and monitors and equipment checked  Additional Notes  1% lidocaine skin wheal  Prep:  Pt Position:sitting  Sterile Tech:cap, gloves, mask and sterile barrier  Prep:povidone-iodine 7.5% surgical scrub  Monitoring:blood pressure monitoring  Epidural Block Procedure:  Approach:midline  Guidance:palpation technique  Location:L3-L4  Needle Type:Tuohy  Needle Gauge:17 G  Loss of Resistance Medium: saline  Loss of Resistance: 7cm  Cath Depth at skin:15 cm  Paresthesia: none  Aspiration:negative  Test Dose:negative  Number of Attempts: 2  Post Assessment:  Dressing:occlusive dressing applied and secured with tape  Pt Tolerance:patient tolerated the procedure well with no apparent complications  Complications:no

## 2019-10-11 NOTE — PLAN OF CARE
Problem: Patient Care Overview  Goal: Plan of Care Review  Outcome: Ongoing (interventions implemented as appropriate)   10/11/19 1101   Coping/Psychosocial   Plan of Care Reviewed With patient   Plan of Care Review   Progress improving   OTHER   Outcome Summary Fundus firm, small rubra bleeding. Hem+. Lungs clear. BS+. No complaints at this time.     Goal: Individualization and Mutuality  Outcome: Ongoing (interventions implemented as appropriate)    Goal: Discharge Needs Assessment  Outcome: Ongoing (interventions implemented as appropriate)    Goal: Interprofessional Rounds/Family Conf  Outcome: Ongoing (interventions implemented as appropriate)      Problem: Postpartum (Vaginal Delivery) (Adult,Obstetrics,Pediatric)  Goal: Signs and Symptoms of Listed Potential Problems Will be Absent, Minimized or Managed (Postpartum)  Outcome: Ongoing (interventions implemented as appropriate)

## 2019-10-12 PROBLEM — Z33.1 IUP (INTRAUTERINE PREGNANCY), INCIDENTAL: Status: ACTIVE | Noted: 2019-10-12

## 2019-10-12 LAB
HCT VFR BLD AUTO: 34.4 % (ref 34–46.6)
HGB BLD-MCNC: 11.2 G/DL (ref 12–15.9)

## 2019-10-12 PROCEDURE — 85014 HEMATOCRIT: CPT | Performed by: OBSTETRICS & GYNECOLOGY

## 2019-10-12 PROCEDURE — 85018 HEMOGLOBIN: CPT | Performed by: OBSTETRICS & GYNECOLOGY

## 2019-10-12 RX ORDER — ACETAMINOPHEN 325 MG/1
650 TABLET ORAL EVERY 6 HOURS PRN
Status: DISCONTINUED | OUTPATIENT
Start: 2019-10-11 | End: 2019-10-13 | Stop reason: HOSPADM

## 2019-10-12 RX ADMIN — IBUPROFEN 800 MG: 800 TABLET, FILM COATED ORAL at 21:43

## 2019-10-12 RX ADMIN — BUPRENORPHINE HCL 4 MG: 2 TABLET SUBLINGUAL at 08:24

## 2019-10-12 RX ADMIN — IBUPROFEN 800 MG: 800 TABLET, FILM COATED ORAL at 08:24

## 2019-10-12 RX ADMIN — IBUPROFEN 800 MG: 800 TABLET, FILM COATED ORAL at 16:30

## 2019-10-12 RX ADMIN — HYDROCORTISONE 2.5% 1 APPLICATION: 25 CREAM TOPICAL at 21:43

## 2019-10-12 RX ADMIN — PRENATAL VIT W/ FE FUMARATE-FA TAB 27-0.8 MG 1 TABLET: 27-0.8 TAB at 08:24

## 2019-10-12 NOTE — PROGRESS NOTES
-Discharge Planning Assessment  [facility]     Patient Name: Chinmay Thomas  MRN: [id number]  Today's Date: 10/12/2019    Admit Date: 10/11/2019    On call  made a report to on call [facility] , Kaiden Solitario [phone], at 7:00pm on 10/11/19. SS reported that mother had delivered a baby boy who had a positive UDS for Buprenophine. SS also reported that mother has a prescription for Subutex, and goes to a Subutex clinic clinic in [place], [place].     SS spoke with mother on this day who states that she lives at [address] [place] [address]. Mother that that she and FOB, Jonathan Tavares, and their one year old Shirley Tavares, are currently living with her father. Mother states that she now has five children total; Alannah Bashir (8), Mark Bashir ( 4), Jimmie Tavares (2), Shirley Cardoza (1), and Pawan Tavares (). Mother states that the only child that she has custody of is Shirley Tavares, and that her other children are in the custody of family members.     Mother states that she received her prenatal care in [place], and went to all of her appointments. Mother states that she has a car seat and other infant supplies needed for infant. Mother states that she receives WIC and SNAP benefits.     SS reported to [facility] on call , Kaiden Solitario, after speaking with mother. Altaf states that report will not be accepted due to the fact that mother and baby were not positive for anything that was not prescribed.     Infant to be discharged home with mother. FOB to be provide transportation.       Prerna Sarah, BRIIW

## 2019-10-12 NOTE — NURSING NOTE
AT 1345 SVE 10 with head crowing. Called out to notify DR Bautista to attend delivery.  Infant delivered precipitously at 1353, while waiting on Dr Bautista to arrive. Baby placed on mothers belly with cord clamped Dr Bautista arrived and cut the cord moments after delivery.

## 2019-10-12 NOTE — PAYOR COMM NOTE
"[facility]   BAILEY MALDONADO  PHONE  [phone]  FAX  [phone]  NPI:  [id number]    DELIVERY NOTIFICATION   10/11/19 @ 1353  VAGINAL DELIVERY  MALE    APGARS:  8/9WT:    WT:  2842 GMS  ICD:  O80  DR. PRINCE   NPI:  [id number]    MarthaCarlos Manuel hood (24 y.o. Female)     Date of Birth Social Security Number Address Home Phone MRN    1995  [address] [facility] [address]  [id number]    Congregational Marital Status          None Single       Admission Date Admission Type Admitting Provider Attending Provider Department, Room/Bed    10/11/19 Elective Mona Prince, Mona Swanson DO [facility], W236    Discharge Date Discharge Disposition Discharge Destination                       Attending Provider:  Mona Prince DO    Allergies:  No Known Allergies    Isolation:  None   Infection:  None   Code Status:  CPR    Ht:  167.6 cm (66\")   Wt:  --    Admission Cmt:  None   Principal Problem:  None                Active Insurance as of 10/11/2019     Primary Coverage     Payor Plan Insurance Group Employer/Plan Group    [employer]HEM MEDICAID [employer] MEDICAID [id number]     Payor Plan Address Payor Plan Phone Number Payor Plan Fax Number Effective Dates    [address] [address] [phone]  2018 - None Entered    [facility] [id number]       Subscriber Name Subscriber Birth Date Member ID       CARLOS MANUEL BAILEY 1995 [id number]                 Emergency Contacts      (Rel.) Home Phone Work Phone Mobile Phone    MarthaGeovanna hood (Relative) -- -- [phone]               History & Physical      Mona Prince DO at 10/11/19 0817          [facility]  Obstetric History and Physical    Chief Complaint   Patient presents with   • Contractions     38 weeks from [place], states was to be induced today.       Subjective     Patient is a 24 y.o. female  currently at 38w0d, who presents with contractions.  She was scheduled for " IOL today secondary to severe IUGR 2% in office at [place], [place].  Pt states she does not want to be delivered there because her other children were delivered here.    Her prenatal care is complicated by  substance abuse  narcotics maintenance program, hypertension  gestational hypertension and abnormal fetal growth  IUGR.  Her previous obstetric/gynecological history is noted for is non-contributory.    The following portions of the patients history were reviewed and updated as appropriate: current medications, allergies, past medical history, past surgical history, past family history, past social history and problem list .       Prenatal Information:   Maternal Prenatal Labs  Blood Type No results found for: ABO   Rh Status No results found for: RH   Antibody Screen No results found for: ABSCRN   Rapid Urine Drug Screen No results found for: AMPMETHU, BARBITSCNUR, LABBENZSCN, LABMETHSCN, LABOPIASCN, THCURSCR, COCAINEUR, AMPHETSCREEN, PROPOXSCN, BUPRENORSCNU, METAMPSCNUR, OXYCODONESCN, TRICYCLICSCN   Group B Strep Culture No results found for: GBSANTIGEN           External Prenatal Results     Pregnancy Outside Results - Transcribed From Office Records - See Scanned Records For Details     Test Value Date Time    Hgb 12.9 g/dL 10/10/18 0650    Hct 37.4 % 10/10/18 0650    ABO O  05/15/19     Rh Positive  05/15/19     Antibody Screen Negative  05/15/19     Glucose Fasting GTT       Glucose Tolerance Test 1 hour       Glucose Tolerance Test 3 hour       Gonorrhea (discrete) Negative  05/15/19     Chlamydia (discrete) Negative  05/15/19     RPR Non-Reactive  05/15/19     VDRL       Syphilis Antibody       Rubella Immune  05/15/19     HBsAg Negative  05/15/19     Herpes Simplex Virus PCR       Herpes Simplex VIrus Culture       HIV Non-Reactive  05/15/19     Hep C RNA Quant PCR       Hep C Antibody pos  05/15/19     AFP       Group B Strep Negative  09/20/19     GBS Susceptibility to Clindamycin       GBS Susceptibility  to Erythromycin       Fetal Fibronectin       Genetic Testing, Maternal Blood             Drug Screening     Test Value Date Time    Urine Drug Screen       Amphetamine Screen Negative  18 1012    Barbiturate Screen Negative  10/07/18 2248    Benzodiazepine Screen Negative  18 1012    Methadone Screen Negative  18 1012    Phencyclidine Screen Negative  10/07/18 2248    Opiates Screen Negative  18 1012    THC Screen Negative  18 1012    Cocaine Screen Negative  18 1012    Propoxyphene Screen       Buprenorphine Screen Positive  18 1012    Methamphetamine Screen Negative  18 1012    Oxycodone Screen Negative  18 1012    Tricyclic Antidepressants Screen                     Past OB History:     Obstetric History       T4      L4     SAB0   TAB0   Ectopic0   Molar0   Multiple0   Live Births4       # Outcome Date GA Lbr Wilmer/2nd Weight Sex Delivery Anes PTL Lv   5 Current            4 Term 10/08/18 39w0d 03:22 / 00:04 3334 g (7 lb 5.6 oz) F Vag-Spont EPI N BATSHEVA      Name: AILYN BAILEY      Apgar1:  9                Apgar5: 9   3 Term 17 40w0d  3402 g (7 lb 8 oz) F Vag-Spont None N BATSHEVA   2 Term 09/04/15 40w0d  3345 g (7 lb 6 oz) M Vag-Spont EPI N BATSHEVA   1 Term 11 40w0d  3232 g (7 lb 2 oz) F Vag-Spont EPI N BATSHEVA          Past Medical History: Past Medical History:   Diagnosis Date   • Hepatitis C    • Substance abuse (CMS/HCC)       Past Surgical History Past Surgical History:   Procedure Laterality Date   • EAR TUBES Bilateral 84   • EAR TUBES     • MOUTH SURGERY        Family History: Family History   Problem Relation Age of Onset   • Bipolar disorder Mother    • Hypertension Father    • Kidney cancer Paternal Grandmother    • Diabetes Maternal Grandfather    • ADD / ADHD Neg Hx    • Suicide Attempts Neg Hx    • Self-Injurious Behavior  Neg Hx    • Alcohol abuse Neg Hx       Social History:  reports that she has been smoking.  She  started smoking about 11 years ago. She has a 10.00 pack-year smoking history. She uses smokeless tobacco.   reports that she does not drink alcohol.   reports that she uses drugs. Drugs: Benzodiazepines and Other.        Review of Systems      Objective     Vital Signs Range for the last 24 hours  Temperature:     Temp Source:     BP: BP: (120)/(72) 120/72   Pulse: Heart Rate:  [87] 87   Respirations:     Weight:       Physical Examination: General appearance - alert, well appearing, and in no distress  Chest - clear to auscultation, no wheezes, rales or rhonchi, symmetric air entry  Heart - normal rate and regular rhythm  Abdomen - soft, nontender, nondistended, no masses or organomegaly  Extremities - no pedal edema noted    Presentation: cephalic   Cervix: Exam by:  Dr Bautista   Dilation:  5   Effacement:  80   Station:  -3         Assessment/Plan       Pregnant      Assessment & Plan    Assessment:  1.  Intrauterine pregnancy at 38w0d weeks gestation with reactive fetal status.    2. Severe IUGR- Will alert NICU.  CEFM.  Reactive NST.   3. Substance Abuse DO- Pt states she is prescribed 16mg Subutex daily but that she tries to only take 4mg daily.    4. GBS negative.   5. Labor- pt has advance dilation.  Will keep and augment as needed.     Mona Bautista DO  10/11/2019  8:17 AM    Electronically signed by Mona Bautista DO at 10/11/19 0822       Discharge Summary     No notes of this type exist for this encounter.

## 2019-10-12 NOTE — PLAN OF CARE
Problem: Postpartum (Vaginal Delivery) (Adult,Obstetrics,Pediatric)  Goal: Signs and Symptoms of Listed Potential Problems Will be Absent, Minimized or Managed (Postpartum)  Outcome: Ongoing (interventions implemented as appropriate)   10/12/19 0136   Goal/Outcome Evaluation   Problems Assessed (Postpartum Vaginal Delivery) all   Problems Present (Postpartum Vag Deliv) pain

## 2019-10-12 NOTE — PROGRESS NOTES
ROSA Cortes  Vaginal Delivery Progress Note    Subjective   Subjective  Postpartum Day 1: Vaginal Delivery    The patient feels well.  Her pain is well controlled with nonsteroidal anti-inflammatory drugs.   She is ambulating well.  Patient describes her bleeding as moderate lochia.        Objective     Objective   Vital Signs Range for the last 24 hours  Temperature: Temp:  [97.5 °F (36.4 °C)-98.2 °F (36.8 °C)] 98.2 °F (36.8 °C)   Temp Source: Temp src: Oral   BP: BP: (108-129)/(56-77) 110/67   Pulse: Heart Rate:  [75-94] 87   Respirations: Resp:  [18] 18   Weight:       Admit Height:       Physical Exam:  General:  no acute distresss.  Abdomen: Fundus: appropriate, firm, non tender  Extremities: normal, atraumatic, no cyanosis, and trace edema.     [unfilled]       Lab Results   Component Value Date    ABO O 10/11/2019    RH Positive 10/11/2019        Lab Results   Component Value Date    HGB 11.2 (L) 10/12/2019    HCT 34.4 10/12/2019         Assessment/Plan   Active Problems:    Postpartum care and examination    IUP (intrauterine pregnancy), incidental      Chinmay Harrell Martha is Day 1  post-partum  Vaginal, Spontaneous    .      Plan:  Continue current care.      Mona Bautista DO  10/12/2019  12:16 PM

## 2019-10-12 NOTE — PLAN OF CARE
Problem: Patient Care Overview  Goal: Plan of Care Review  Outcome: Ongoing (interventions implemented as appropriate)   10/11/19 1802 10/12/19 9854   Coping/Psychosocial   Plan of Care Reviewed With --  patient   Plan of Care Review   Progress --  improving   OTHER   Outcome Summary Fundus firm, small rubra bleeding. Hem+. Lungs clear. BS+. No complaints at this time. --      Goal: Individualization and Mutuality  Outcome: Ongoing (interventions implemented as appropriate)    Goal: Discharge Needs Assessment  Outcome: Ongoing (interventions implemented as appropriate)    Goal: Interprofessional Rounds/Family Conf  Outcome: Ongoing (interventions implemented as appropriate)      Problem: Postpartum (Vaginal Delivery) (Adult,Obstetrics,Pediatric)  Goal: Signs and Symptoms of Listed Potential Problems Will be Absent, Minimized or Managed (Postpartum)  Outcome: Ongoing (interventions implemented as appropriate)

## 2019-10-13 VITALS
DIASTOLIC BLOOD PRESSURE: 56 MMHG | SYSTOLIC BLOOD PRESSURE: 102 MMHG | RESPIRATION RATE: 16 BRPM | TEMPERATURE: 97.8 F | HEART RATE: 88 BPM | OXYGEN SATURATION: 99 %

## 2019-10-13 RX ORDER — IBUPROFEN 800 MG/1
800 TABLET ORAL 3 TIMES DAILY
Qty: 40 TABLET | Refills: 0 | Status: SHIPPED | OUTPATIENT
Start: 2019-10-13

## 2019-10-13 RX ADMIN — BUPRENORPHINE HCL 4 MG: 2 TABLET SUBLINGUAL at 09:54

## 2019-10-13 RX ADMIN — PRENATAL VIT W/ FE FUMARATE-FA TAB 27-0.8 MG 1 TABLET: 27-0.8 TAB at 09:54

## 2019-10-13 RX ADMIN — IBUPROFEN 800 MG: 800 TABLET, FILM COATED ORAL at 09:54

## 2019-10-13 NOTE — DISCHARGE INSTR - APPOINTMENTS
Please Call [facility] at [phone] for a 3 week follow up with Dr. Ragsdale

## 2019-10-13 NOTE — DISCHARGE SUMMARY
ROSA Cortes  Delivery Discharge Summary    Primary OB Clinician:     EDC: Estimated Date of Delivery: 10/25/19    Gestational Age:38w0d    Antepartum complications: none    Date of Delivery: 10/11/2019   Time of Delivery: 1:53 PM     Delivered By:  Mona Bautista     Delivery Type: Vaginal, Spontaneous      Tubal Ligation: n/a    Baby:@BABYNOHDR(SEX)@ infant;   Apgar:  8   @ 1 minute /   Apgar:  9   @ 5 minutes   Weight: @BABYNOHDR(BIRTHWEIGHT)@     Anesthesia: Epidural      Intrapartum complications: None    [unfilled]       Lab Results   Component Value Date    ABO O 10/11/2019    RH Positive 10/11/2019        Lab Results   Component Value Date    HGB 11.2 (L) 10/12/2019    HCT 34.4 10/12/2019       Subjective   Subjective  Postpartum Day 2: Vaginal Delivery    The patient feels well.  Her pain is well controlled with nonsteroidal anti-inflammatory drugs.   She is ambulating well.  Patient describes her bleeding as thin lochia.    Breastfeeding: without difficulty.    Objective     Objective   Vital Signs Range for the last 24 hours  Temperature: Temp:  [97.8 °F (36.6 °C)-98.4 °F (36.9 °C)] 97.8 °F (36.6 °C)   Temp Source: Temp src: Oral   BP: BP: (102-131)/(56-77) 102/56   Pulse: Heart Rate:  [86-88] 88   Respirations: Resp:  [16-18] 16   Weight:       Admit Height:       Physical Exam:  General:  no acute distresss.  Abdomen: Fundus: appropriate, firm, non tender  Extremities: normal, atraumatic, no cyanosis, and trace edema.         Assesment and Plan:    PPD2 s/p   Follow-up appointment with [facility] in 3 weeks.    Discharge Date: 10/13/2019; Discharge Time: 11:59 AM        Mona Bautista DO  10/13/2019  11:58 AM

## 2019-10-14 NOTE — PAYOR COMM NOTE
"[facility]   BAILEY MALDONADO  PHONE  [phone]  FAX  [phone]  NPI:  [id number]    PATIENT D/C 10/13/19    Carlos Manuel Thomas (24 y.o. Female)     Date of Birth Social Security Number Address Home Phone MRN    1995  [address] [facility] [address]  [id number]    Mandaen Marital Status          None Single       Admission Date Admission Type Admitting Provider Attending Provider Department, Room/Bed    10/11/19 Elective Mona Bautista DO  [facility], W236/1    Discharge Date Discharge Disposition Discharge Destination        10/13/2019 Home or Self Care              Attending Provider:  (none)   Allergies:  No Known Allergies    Isolation:  None   Infection:  None   Code Status:  Prior    Ht:  167.6 cm (66\")   Wt:  --    Admission Cmt:  None   Principal Problem:  None                Active Insurance as of 10/11/2019     Primary Coverage     Payor Plan Insurance Group Employer/Plan Group    ANTH MEDICAID [facility] MEDICAID [id number]     Payor Plan Address Payor Plan Phone Number Payor Plan Fax Number Effective Dates    [address] [address] [phone]  7/1/2018 - None Entered    [facility] [id number]       Subscriber Name Subscriber Birth Date Member ID       CARLOS MANUEL THOMAS 1995 [id number]                 Emergency Contacts      (Rel.) Home Phone Work Phone Mobile Phone    MarthaGeovanna hood (Relative) -- -- [phone]              "

## 2019-10-15 LAB
LAB AP CASE REPORT: NORMAL
PATH REPORT.FINAL DX SPEC: NORMAL

## 2020-10-16 ENCOUNTER — TRANSCRIBE ORDERS (OUTPATIENT)
Dept: WOMENS IMAGING | Facility: HOSPITAL | Age: 25
End: 2020-10-16

## 2020-10-16 DIAGNOSIS — O28.3 ABNORMAL FETAL ULTRASOUND OF SPINE: Primary | ICD-10-CM

## 2020-10-27 ENCOUNTER — APPOINTMENT (OUTPATIENT)
Dept: WOMENS IMAGING | Facility: HOSPITAL | Age: 25
End: 2020-10-27

## 2022-08-04 NOTE — PLAN OF CARE
Problem: Patient Care Overview  Goal: Plan of Care Review  Outcome: Ongoing (interventions implemented as appropriate)   10/13/19 0620   Coping/Psychosocial   Plan of Care Reviewed With patient   Plan of Care Review   Progress improving       Problem: Postpartum (Vaginal Delivery) (Adult,Obstetrics,Pediatric)  Goal: Signs and Symptoms of Listed Potential Problems Will be Absent, Minimized or Managed (Postpartum)  Outcome: Ongoing (interventions implemented as appropriate)   10/13/19 0620   Goal/Outcome Evaluation   Problems Assessed (Postpartum Vaginal Delivery) all   Problems Present (Postpartum Vag Deliv) none          Enbrel Counseling:  I discussed with the patient the risks of etanercept including but not limited to myelosuppression, immunosuppression, autoimmune hepatitis, demyelinating diseases, lymphoma, and infections.  The patient understands that monitoring is required including a PPD at baseline and must alert us or the primary physician if symptoms of infection or other concerning signs are noted.